# Patient Record
Sex: MALE | Race: BLACK OR AFRICAN AMERICAN | Employment: UNEMPLOYED | URBAN - METROPOLITAN AREA
[De-identification: names, ages, dates, MRNs, and addresses within clinical notes are randomized per-mention and may not be internally consistent; named-entity substitution may affect disease eponyms.]

---

## 2018-09-07 ENCOUNTER — OFFICE VISIT (OUTPATIENT)
Dept: FAMILY MEDICINE CLINIC | Age: 43
End: 2018-09-07

## 2018-09-07 VITALS
WEIGHT: 171 LBS | TEMPERATURE: 97.7 F | HEART RATE: 94 BPM | DIASTOLIC BLOOD PRESSURE: 73 MMHG | OXYGEN SATURATION: 98 % | HEIGHT: 61 IN | SYSTOLIC BLOOD PRESSURE: 113 MMHG | BODY MASS INDEX: 32.28 KG/M2 | RESPIRATION RATE: 18 BRPM

## 2018-09-07 DIAGNOSIS — Z02.89 ENCOUNTER FOR COMPLETION OF FORM WITH PATIENT: ICD-10-CM

## 2018-09-07 DIAGNOSIS — F42.4 SKIN PICKING HABIT: ICD-10-CM

## 2018-09-07 DIAGNOSIS — Z13.220 ENCOUNTER FOR LIPID SCREENING FOR CARDIOVASCULAR DISEASE: ICD-10-CM

## 2018-09-07 DIAGNOSIS — Z11.59 SCREENING FOR VIRAL DISEASE: ICD-10-CM

## 2018-09-07 DIAGNOSIS — Z13.228 SCREENING FOR ENDOCRINE, METABOLIC AND IMMUNITY DISORDER: ICD-10-CM

## 2018-09-07 DIAGNOSIS — Z13.1 SCREENING FOR DIABETES MELLITUS: ICD-10-CM

## 2018-09-07 DIAGNOSIS — Z11.1 ENCOUNTER FOR PPD TEST: ICD-10-CM

## 2018-09-07 DIAGNOSIS — Z13.6 ENCOUNTER FOR LIPID SCREENING FOR CARDIOVASCULAR DISEASE: ICD-10-CM

## 2018-09-07 DIAGNOSIS — Z00.00 ENCOUNTER FOR MEDICARE ANNUAL WELLNESS EXAM: Primary | ICD-10-CM

## 2018-09-07 DIAGNOSIS — Z13.29 SCREENING FOR ENDOCRINE, METABOLIC AND IMMUNITY DISORDER: ICD-10-CM

## 2018-09-07 DIAGNOSIS — R05.9 COUGH: ICD-10-CM

## 2018-09-07 DIAGNOSIS — Z28.21 INFLUENZA VACCINATION DECLINED: ICD-10-CM

## 2018-09-07 DIAGNOSIS — H61.20 WAX IN EAR: ICD-10-CM

## 2018-09-07 DIAGNOSIS — Z13.0 SCREENING FOR ENDOCRINE, METABOLIC AND IMMUNITY DISORDER: ICD-10-CM

## 2018-09-07 PROBLEM — Q90.9 DOWN SYNDROME: Status: ACTIVE | Noted: 2018-09-07

## 2018-09-07 RX ORDER — DEXTROMETHORPHAN POLISTIREX 30 MG/5ML
60 SUSPENSION ORAL
Qty: 148 ML | Refills: 0 | Status: SHIPPED | OUTPATIENT
Start: 2018-09-07 | End: 2018-10-04 | Stop reason: ALTCHOICE

## 2018-09-07 RX ORDER — CETIRIZINE HCL 10 MG
10 TABLET ORAL DAILY
Qty: 30 TAB | Refills: 0 | Status: SHIPPED | OUTPATIENT
Start: 2018-09-07 | End: 2018-10-04 | Stop reason: SDUPTHER

## 2018-09-07 NOTE — PROGRESS NOTES
9/7/2018 
3:05 PM 
 
 
This is an Initial Medicare Annual Wellness Exam (AWV) (Performed 12 months after IPPE or effective date of Medicare Part B enrollment, Once in a lifetime) I have reviewed the patient's medical history in detail and updated the computerized patient record. History Past Medical History:  
Diagnosis Date  Down syndrome Past Surgical History:  
Procedure Laterality Date  HX OTHER SURGICAL Right 1979  
 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  
 
 
Depression Risk Factor Screening: PHQ over the last two weeks 9/7/2018 PHQ Not Done Medical Reason (indicate in comments) Alcohol Risk Factor Screening: You do not drink alcohol or very rarely. Functional Ability and Level of Safety:  
 
Hearing Loss No hearing loss noted Activities of Daily Living Self-care. Requires assistance with: no ADLs Fall Risk No flowsheet data found. Abuse Screen Patient is not abused Denies financial, physical, or verbal abuse Review of Systems Constitutional: negative for fevers, chills and weight loss Respiratory: negative for cough, wheezing or dyspnea on exertion Cardiovascular: negative for chest pain, palpitations Gastrointestinal: negative for abdominal pain, N/V/D Musculoskeletal:negative for myalgias and muscle weakness Labwork/Imaging No results found for: GLU, GLUCPOC No results found for: CHOL, CHOLPOCT, CHOLX, CHLST, CHOLV, HDL, HDLPOC, LDL, LDLCPOC, LDLC, DLDLP, VLDLC, VLDL, TGLX, TRIGL, TRIGP, TGLPOCT, CHHD, CHHDX Physical Examination Evaluation of Cognitive Function: Mood/affect:  Neutral, nonverbal today Appearance: age appropriate, casually dressed and within normal Limits Family member/caregiver input: mother present Blood pressure 113/73, pulse 94, temperature 97.7 °F (36.5 °C), temperature source Oral, resp. rate 18, height 5' 1\" (1.549 m), weight 171 lb (77.6 kg), SpO2 98 %. Body mass index is 32.31 kg/(m^2). General appearance: alert, cooperative, no distress Lungs: clear to auscultation bilaterally Heart: regular rate and rhythm, S1, S2 normal, no murmur, click, rub or gallop Abdomen: soft, non-tender. Bowel sounds normal. No masses,  no organomegaly Extremities: extremities normal, atraumatic, no cyanosis or edema Patient Care Team: 
Macario Bloch, NP as PCP - General (Nurse Practitioner) Advice/Referrals/Counseling Education and counseling provided: 
Are appropriate based on today's review and evaluation Influenza Vaccine Cardiovascular screening blood test 
Diabetes screening test 
 
Assessment/Plan Diagnoses and all orders for this visit: 
 
Encounter for Medicare annual wellness exam 
*Medicare wellness completed. Education and counseling provided, recommendations made- see Medicare chart in patient instructions and see below. Encounter for lipid screening for cardiovascular disease -     LIPID PANEL; Future Screening for diabetes mellitus -     METABOLIC PANEL, COMPREHENSIVE; Future Screening for endocrine, metabolic and immunity disorder 
-     CBC W/O DIFF; Future -     METABOLIC PANEL, COMPREHENSIVE; Future 
-     TSH 3RD GENERATION; Future Influenza vaccination declined *Mother prefers to have done later in season. *Plan of care reviewed with parent. Parent in agreement with plan and expresses understanding. All questions answered and parent encouraged to call or RTO if further questions or concerns. Follow-up Disposition: 
Return in about 3 days (around 9/10/2018) for lab visit and PPD reading. Yearly provider visit for medicare wellness. ..

## 2018-09-07 NOTE — PROGRESS NOTES
Chief Complaint Patient presents with [address] [place] [address] Care  Complete Physical  
 Cough  
  x 2weeks 1. Have you been to the ER, urgent care clinic since your last visit? Hospitalized since your last visit? No 
 
2. Have you seen or consulted any other health care providers outside of the [address] [place] [address] since your last visit? Include any pap smears or colon screening.  No

## 2018-09-07 NOTE — MR AVS SNAPSHOT
[facility] [address] [facility] 
[phone] Patient: Claudia Kee MRN: [id number]:[id number] Visit Information Date & Time Provider Department Dept. Phone Encounter #  
 9/7/2018  1:30 PM Lul Govea [phone] [id number] Follow-up Instructions Return in about 3 days (around 9/10/2018) for lab visit and PPD reading. Yearly provider visit for medicare wellness. Eufemia Sanchez Upcoming Health Maintenance Date Due DTaP/Tdap/Td series (1 - Tdap) 2/23/1996 Influenza Age 5 to Adult 8/1/2018 Allergies as of 9/7/2018  Review Complete On: 9/7/2018 By: Russell Bradshaw LPN No Known Allergies Current Immunizations  Never Reviewed Name Date  
 TB Skin Test (PPD) Intradermal  Incomplete Not reviewed this visit You Were Diagnosed With   
  
 Codes Comments Encounter for Medicare annual wellness exam    -  Primary ICD-10-CM: Z00.00 ICD-9-CM: V70.0 Encounter for lipid screening for cardiovascular disease     ICD-10-CM: Z13.220, Z13.6 ICD-9-CM: V77.91, V81.2 Screening for diabetes mellitus     ICD-10-CM: Z13.1 ICD-9-CM: V77.1 Screening for endocrine, metabolic and immunity disorder     ICD-10-CM: Z13.29, Z13.228, Z13.0 ICD-9-CM: V77.99 Screening for viral disease     ICD-10-CM: Z11.59 
ICD-9-CM: V73.99 Cough     ICD-10-CM: R05 ICD-9-CM: 786.2 Skin picking habit     ICD-10-CM: F42.4 ICD-9-CM: 307.9 Encounter for PPD test     ICD-10-CM: Z11.1 ICD-9-CM: V74.1 Encounter for laboratory examination     ICD-10-CM: Z01.89 ICD-9-CM: V72.60 Vitals BP Pulse Temp Resp Height(growth percentile) Weight(growth percentile) 113/73 (BP 1 Location: Left arm, BP Patient Position: Sitting) 94 97.7 °F (36.5 °C) (Oral) 18 5' 1\" (1.549 m) 171 lb (77.6 kg) SpO2 BMI Smoking Status 98% 32.31 kg/m2 Never Smoker BMI and BSA Data Body Mass Index Body Surface Area  
 32.31 kg/m 2 1.83 m 2 Preferred Pharmacy Pharmacy Name Phone [facility] [address] [phone] Your Updated Medication List  
  
   
This list is accurate as of 9/7/18  2:19 PM.  Always use your most recent med list.  
  
  
  
  
 cetirizine 10 mg tablet Commonly known as:  ZYRTEC Take 1 Tab by mouth daily. dextromethorphan 30 mg/5 mL liquid Commonly known as:  Delsym Take 10 mL by mouth nightly as needed for Cough. Prescriptions Sent to Pharmacy Refills  
 cetirizine (ZYRTEC) 10 mg tablet 0 Sig: Take 1 Tab by mouth daily. Class: Normal  
 Pharmacy: [address] [place] [address] Ph #: [phone] Route: Oral  
 dextromethorphan (DELSYM) 30 mg/5 mL liquid 0 Sig: Take 10 mL by mouth nightly as needed for Cough. Class: Normal  
 Pharmacy: [address] [place] [address] Ph #: [phone] Route: Oral  
  
We Performed the Following AMB POC TUBERCULOSIS, INTRADERMAL (SKIN TEST) [39292 CPT(R)] COLLECTION VENOUS BLOOD,VENIPUNCTURE S118614 CPT(R)] Follow-up Instructions Return in about 3 days (around 9/10/2018) for lab visit and PPD reading. Yearly provider visit for medicare wellness. Tyrone Nelson To-Do List   
 09/07/2018 Lab:  HEP B SURFACE AB Around 09/08/2018 Lab:  CBC W/O DIFF Around 09/08/2018 Lab:  LIPID PANEL Around 09/08/2018 Lab:  METABOLIC PANEL, COMPREHENSIVE Around 09/08/2018 Lab:  TSH 3RD GENERATION Introducing [facility] & HEALTH SERVICES! [facility] introduces WhoisEDI patient portal. Now you can access parts of your medical record, email your doctor's office, and request medication refills online. 1. In your internet browser, go to [email or web address] [employer] 2. Click on the First Time User? Click Here link in the Sign In box. You will see the New Member Sign Up page. 3. Enter your Ryonet Access Code exactly as it appears below. You will not need to use this code after youve completed the sign-up process. If you do not sign up before the expiration date, you must request a new code. · Ryonet Access Code: [email or web address] Expires: 12/6/2018  1:33 PM 
 
4. Enter the last four digits of your Social Security Number (xxxx) and Date of Birth (mm/dd/yyyy) as indicated and click Submit. You will be taken to the next sign-up page. 5. Create a [employer] ID. This will be your Ryonet login ID and cannot be changed, so think of one that is secure and easy to remember. 6. Create a Ryonet password. You can change your password at any time. 7. Enter your Password Reset Question and Answer. This can be used at a later time if you forget your password. 8. Enter your e-mail address. You will receive e-mail notification when new information is available in [address] 9. Click Sign Up. You can now view and download portions of your medical record. 10. Click the Download Summary menu link to download a portable copy of your medical information. If you have questions, please visit the Frequently Asked Questions section of the Ryonet website. Remember, Ryonet is NOT to be used for urgent needs. For medical emergencies, dial 911. Now available from your iPhone and Android! Please provide this summary of care documentation to your next provider. Your primary care clinician is listed as Carlos Goldberg. If you have any questions after today's visit, please call [phone].

## 2018-09-07 NOTE — PROGRESS NOTES
OFFICE NOTE Rey West is a 37 y.o. male presenting today for office visit. 9/7/2018 
1:50 PM 
 
 
Chief Complaint Patient presents with [address] [facility] Establish Care  Complete Physical  
 Cough  
  x 2weeks HPI: Here today as a new patient, establishing care. Mother here today with patient. Medicare wellness completed (see other note). Previously seeing Saloni Corbett for PCP- no records available at this time. Patient with Down's Syndrome- nonverbal around new people per mother but is verbal at home. Needs form completed for [facility]. Mother reports a couple concerns. First, patient has been coughing at night time for about 2 weeks- no ENT symptoms, SOB, or wheezing. Has not been taking anything for symptoms. Does go to day support so is around sick contacts at times. Also reports some small marks on his arms that appear to be bites or new pick marks that are occurring. Also reports noticing some ear wax. Review of Systems Reason unable to perform ROS: obtained from mother. Constitutional: Negative for activity change, appetite change and fever. HENT: Negative for congestion, ear pain, rhinorrhea, sneezing, sore throat and trouble swallowing. Eyes: Negative for discharge and itching. Respiratory: Positive for cough. Negative for shortness of breath and wheezing. Cardiovascular: Negative for chest pain and leg swelling. Gastrointestinal: Negative for abdominal pain, constipation, diarrhea and vomiting. Genitourinary: Negative for difficulty urinating and frequency. Musculoskeletal: Negative for gait problem. Skin: Negative for rash. Neurological: Negative for seizures and headaches. Psychiatric/Behavioral: Negative for dysphoric mood and sleep disturbance. The patient is not nervous/anxious. PHQ Screening PHQ over the last two weeks 9/7/2018 PHQ Not Done Medical Reason (indicate in comments) History Past Medical History: Diagnosis Date  Down syndrome Past Surgical History:  
Procedure Laterality Date  HX OTHER SURGICAL Right 1979  
 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  
 Number of children: N/A  
 Years of education: N/A Occupational History  Not on file. Social History Main Topics  Smoking status: Never Smoker  Smokeless tobacco: Never Used  Alcohol use No  
 Drug use: No  
 Sexual activity: No  
 
Other Topics Concern  Not on file Social History Narrative  No narrative on file No Known Allergies No current daily medications Patient Care Team: 
Patient Care Team: 
Anushka Vasquez NP as PCP - General (Nurse Practitioner) LABS: 
None new to review RADIOLOGY: 
None new to review Physical Exam  
Constitutional: He is oriented to person, place, and time. He appears well-developed and well-nourished. No distress. HENT:  
Right Ear: Tympanic membrane, external ear and ear canal normal.  
Left Ear: Tympanic membrane, external ear and ear canal normal.  
Nose: Nose normal. No mucosal edema or rhinorrhea. Right sinus exhibits no maxillary sinus tenderness and no frontal sinus tenderness. Left sinus exhibits no maxillary sinus tenderness and no frontal sinus tenderness. Mouth/Throat: Uvula is midline, oropharynx is clear and moist and mucous membranes are normal. No oropharyngeal exudate or posterior oropharyngeal erythema.  
+mild dried cerumen bilateral ear canals Eyes: Right eye exhibits no discharge. Left eye exhibits no discharge. Left pupil is reactive. +right eye surgically absent Neck: Normal range of motion. Neck supple. Cardiovascular: Normal rate, regular rhythm and normal heart sounds. No murmur heard. Pulmonary/Chest: Effort normal and breath sounds normal. No respiratory distress. Abdominal: Soft. Bowel sounds are normal. There is no tenderness. Lymphadenopathy:  
  He has no cervical adenopathy. Neurological: He is alert and oriented to person, place, and time. Skin: Skin is warm and dry.  
-scattered small scabs and scars noted to all extremities; no erythema or drainage Psychiatric: His mood appears not anxious. He does not express impulsivity. He does not exhibit a depressed mood. He is noncommunicative.  
+follows simple directions Vitals:  
 09/07/18 1346 BP: 113/73 Pulse: 94 Resp: 18 Temp: 97.7 °F (36.5 °C) TempSrc: Oral  
SpO2: 98% Weight: 171 lb (77.6 kg) Height: 5' 1\" (1.549 m) PainSc:   0 - No pain Assessment and Plan Cough *Advised on normal exam findings. Will do PRN Delsym at this time. If not improving in 2 weeks or with new symptoms, consider further workup.  
- dextromethorphan (DELSYM) 30 mg/5 mL liquid; Take 10 mL by mouth nightly as needed for Cough. Dispense: 148 mL; Refill: 0 Skin picking habit *Will trial Zyrtec to see if improvement is noted. - cetirizine (ZYRTEC) 10 mg tablet; Take 1 Tab by mouth daily. Dispense: 30 Tab; Refill: 0 Wax in ear *Advised that no ear flushing is needed at this time. Normal amount of ear wax noted on examination. Encounter for completion of form with patient *Form started today. Awaiting reading of PPD, as well as labs. Screening for viral disease *Need evaluation of Hep B vaccination status for form. - HEP B SURFACE AB; Future Encounter for PPD test 
- AMB POC TUBERCULOSIS, INTRADERMAL (SKIN TEST) *Plan of care reviewed with parent. Parent in agreement with plan and expresses understanding. All questions answered and parent encouraged to call or RTO if further questions or concerns. Follow-up Disposition: 
Return in about 3 days (around 9/10/2018) for lab visit and PPD reading. Yearly provider visit for medicare wellness. Trixie Segura

## 2018-09-10 ENCOUNTER — HOSPITAL ENCOUNTER (OUTPATIENT)
Dept: LAB | Age: 43
Discharge: HOME | End: 2018-09-10
Payer: COMMERCIAL

## 2018-09-10 ENCOUNTER — LAB ONLY (OUTPATIENT)
Dept: FAMILY MEDICINE CLINIC | Age: 43
End: 2018-09-10

## 2018-09-10 DIAGNOSIS — Z13.1 SCREENING FOR DIABETES MELLITUS: ICD-10-CM

## 2018-09-10 DIAGNOSIS — Z13.220 ENCOUNTER FOR LIPID SCREENING FOR CARDIOVASCULAR DISEASE: ICD-10-CM

## 2018-09-10 DIAGNOSIS — Z13.228 SCREENING FOR ENDOCRINE, METABOLIC AND IMMUNITY DISORDER: ICD-10-CM

## 2018-09-10 DIAGNOSIS — Z01.89 ENCOUNTER FOR LABORATORY TEST: Primary | ICD-10-CM

## 2018-09-10 DIAGNOSIS — Z13.29 SCREENING FOR ENDOCRINE, METABOLIC AND IMMUNITY DISORDER: ICD-10-CM

## 2018-09-10 DIAGNOSIS — Z11.59 SCREENING FOR VIRAL DISEASE: ICD-10-CM

## 2018-09-10 DIAGNOSIS — Z13.6 ENCOUNTER FOR LIPID SCREENING FOR CARDIOVASCULAR DISEASE: ICD-10-CM

## 2018-09-10 DIAGNOSIS — Z13.0 SCREENING FOR ENDOCRINE, METABOLIC AND IMMUNITY DISORDER: ICD-10-CM

## 2018-09-10 LAB
CHOLEST SERPL-MCNC: 160 MG/DL
ERYTHROCYTE [DISTWIDTH] IN BLOOD BY AUTOMATED COUNT: 14.6 % (ref 11.6–14.5)
HCT VFR BLD AUTO: 41.5 % (ref 36–48)
HDLC SERPL-MCNC: 43 MG/DL (ref 40–60)
HDLC SERPL: 3.7 {RATIO} (ref 0–5)
HGB BLD-MCNC: 13.6 G/DL (ref 13–16)
LDLC SERPL CALC-MCNC: 104.4 MG/DL (ref 0–100)
LIPID PROFILE,FLP: ABNORMAL
MCH RBC QN AUTO: 31.5 PG (ref 24–34)
MCHC RBC AUTO-ENTMCNC: 32.8 G/DL (ref 31–37)
MCV RBC AUTO: 96.1 FL (ref 74–97)
MM INDURATION POC: 0 MM (ref 0–5)
PLATELET # BLD AUTO: 336 K/UL (ref 135–420)
PMV BLD AUTO: 10.1 FL (ref 9.2–11.8)
PPD POC: NEGATIVE NEGATIVE
RBC # BLD AUTO: 4.32 M/UL (ref 4.7–5.5)
TRIGL SERPL-MCNC: 63 MG/DL (ref ?–150)
TSH SERPL DL<=0.05 MIU/L-ACNC: 0.02 UIU/ML (ref 0.36–3.74)
VLDLC SERPL CALC-MCNC: 12.6 MG/DL
WBC # BLD AUTO: 5.5 K/UL (ref 4.6–13.2)

## 2018-09-10 PROCEDURE — 84443 ASSAY THYROID STIM HORMONE: CPT | Performed by: NURSE PRACTITIONER

## 2018-09-10 PROCEDURE — 86706 HEP B SURFACE ANTIBODY: CPT | Performed by: NURSE PRACTITIONER

## 2018-09-10 PROCEDURE — 80061 LIPID PANEL: CPT | Performed by: NURSE PRACTITIONER

## 2018-09-10 PROCEDURE — 85027 COMPLETE CBC AUTOMATED: CPT | Performed by: NURSE PRACTITIONER

## 2018-09-10 PROCEDURE — 80053 COMPREHEN METABOLIC PANEL: CPT | Performed by: NURSE PRACTITIONER

## 2018-09-10 NOTE — PROGRESS NOTES
Patient here today for lab visit at this time. Venipuncture to left forearm at this time. Patient tolerated well at this time. Patient also was here for PPD reading at this time. Reading noted to left forearm at 0mm negative. No redness,no swelling noted at this time.

## 2018-09-11 ENCOUNTER — TELEPHONE (OUTPATIENT)
Dept: FAMILY MEDICINE CLINIC | Age: 43
End: 2018-09-11

## 2018-09-11 DIAGNOSIS — R73.01 ELEVATED FASTING GLUCOSE: Primary | ICD-10-CM

## 2018-09-11 DIAGNOSIS — R79.89 LOW TSH LEVEL: ICD-10-CM

## 2018-09-11 PROBLEM — Z78.9 NOT IMMUNE TO HEPATITIS B VIRUS: Status: ACTIVE | Noted: 2018-09-11

## 2018-09-11 LAB
ALBUMIN SERPL-MCNC: 3.4 G/DL (ref 3.4–5)
ALBUMIN/GLOB SERPL: 0.8 {RATIO} (ref 0.8–1.7)
ALP SERPL-CCNC: 81 U/L (ref 45–117)
ALT SERPL-CCNC: 31 U/L (ref 16–61)
ANION GAP SERPL CALC-SCNC: 10 MMOL/L (ref 3–18)
AST SERPL-CCNC: 17 U/L (ref 15–37)
BILIRUB SERPL-MCNC: 0.3 MG/DL (ref 0.2–1)
BUN SERPL-MCNC: 21 MG/DL (ref 7–18)
BUN/CREAT SERPL: 17 (ref 12–20)
CALCIUM SERPL-MCNC: 8.4 MG/DL (ref 8.5–10.1)
CHLORIDE SERPL-SCNC: 107 MMOL/L (ref 100–108)
CO2 SERPL-SCNC: 27 MMOL/L (ref 21–32)
CREAT SERPL-MCNC: 1.25 MG/DL (ref 0.6–1.3)
GLOBULIN SER CALC-MCNC: 4.2 G/DL (ref 2–4)
GLUCOSE SERPL-MCNC: 119 MG/DL (ref 74–99)
HBV SURFACE AB SER QL IA: NEGATIVE
HBV SURFACE AB SERPL IA-ACNC: 4.26 MIU/ML
HEP BS AB COMMENT,HBSAC: ABNORMAL
POTASSIUM SERPL-SCNC: 4.4 MMOL/L (ref 3.5–5.5)
PROT SERPL-MCNC: 7.6 G/DL (ref 6.4–8.2)
SODIUM SERPL-SCNC: 144 MMOL/L (ref 136–145)

## 2018-09-11 NOTE — TELEPHONE ENCOUNTER
2018  3:38 PM    Chief Complaint   Patient presents with    Results    Hepatitis B     needs vaccine       Noted results of lab. Called mother at this time- verified patient by name and . Advised on findings of elevated glucose and low TSH. Advised on negative Hep B titer. Recommend recheck on TSH, as well as addition of Free T4 and HgBa1c. Recommend Hep B series which will be 0, 1, 6 months. She will try to bring patient into office tomorrow but may be next week. Labs pended in system. Will need Hep B vaccine ordered when comes into office. *Plan of care reviewed with parent. Parent in agreement with plan and expresses understanding. All questions answered and parent encouraged to call or RTO if further questions or concerns. Results for orders placed or performed during the hospital encounter of 09/10/18   CBC W/O DIFF   Result Value Ref Range    WBC 5.5 4.6 - 13.2 K/uL    RBC 4.32 (L) 4.70 - 5.50 M/uL    HGB 13.6 13.0 - 16.0 g/dL    HCT 41.5 36.0 - 48.0 %    MCV 96.1 74.0 - 97.0 FL    MCH 31.5 24.0 - 34.0 PG    MCHC 32.8 31.0 - 37.0 g/dL    RDW 14.6 (H) 11.6 - 14.5 %    PLATELET 912 908 - 785 K/uL    MPV 10.1 9.2 - 48.6 FL   METABOLIC PANEL, COMPREHENSIVE   Result Value Ref Range    Sodium 144 136 - 145 mmol/L    Potassium 4.4 3.5 - 5.5 mmol/L    Chloride 107 100 - 108 mmol/L    CO2 27 21 - 32 mmol/L    Anion gap 10 3.0 - 18 mmol/L    Glucose 119 (H) 74 - 99 mg/dL    BUN 21 (H) 7.0 - 18 MG/DL    Creatinine 1.25 0.6 - 1.3 MG/DL    BUN/Creatinine ratio 17 12 - 20      GFR est AA >60 >60 ml/min/1.73m2    GFR est non-AA >60 >60 ml/min/1.73m2    Calcium 8.4 (L) 8.5 - 10.1 MG/DL    Bilirubin, total 0.3 0.2 - 1.0 MG/DL    ALT (SGPT) 31 16 - 61 U/L    AST (SGOT) 17 15 - 37 U/L    Alk.  phosphatase 81 45 - 117 U/L    Protein, total 7.6 6.4 - 8.2 g/dL    Albumin 3.4 3.4 - 5.0 g/dL    Globulin 4.2 (H) 2.0 - 4.0 g/dL    A-G Ratio 0.8 0.8 - 1.7     LIPID PANEL   Result Value Ref Range    LIPID PROFILE Cholesterol, total 160 <200 MG/DL    Triglyceride 63 <150 MG/DL    HDL Cholesterol 43 40 - 60 MG/DL    LDL, calculated 104.4 (H) 0 - 100 MG/DL    VLDL, calculated 12.6 MG/DL    CHOL/HDL Ratio 3.7 0 - 5.0     TSH 3RD GENERATION   Result Value Ref Range    TSH 0.02 (L) 0.36 - 3.74 uIU/mL   HEP B SURFACE AB   Result Value Ref Range    Hepatitis B surface Ab 4.26 (L) >10.0 mIU/mL    Hep B surface Ab Interp. NEGATIVE  (A) POS      Hep B surface Ab comment        Samples with a  value of 10 mIU/mL or greater are considered positive (protective immunity) in accordance with the CDC guidelines.

## 2018-09-12 ENCOUNTER — OFFICE VISIT (OUTPATIENT)
Dept: FAMILY MEDICINE CLINIC | Age: 43
End: 2018-09-12

## 2018-09-12 ENCOUNTER — HOSPITAL ENCOUNTER (OUTPATIENT)
Dept: LAB | Age: 43
Discharge: HOME | End: 2018-09-12
Payer: COMMERCIAL

## 2018-09-12 DIAGNOSIS — Z23 ENCOUNTER FOR IMMUNIZATION: ICD-10-CM

## 2018-09-12 DIAGNOSIS — Z01.89 ENCOUNTER FOR LABORATORY EXAMINATION: ICD-10-CM

## 2018-09-12 DIAGNOSIS — Z78.9 NOT IMMUNE TO HEPATITIS B VIRUS: ICD-10-CM

## 2018-09-12 DIAGNOSIS — Z23 NEED FOR HEPATITIS B VACCINATION: Primary | ICD-10-CM

## 2018-09-12 DIAGNOSIS — R73.01 ELEVATED FASTING GLUCOSE: ICD-10-CM

## 2018-09-12 DIAGNOSIS — R79.89 LOW TSH LEVEL: ICD-10-CM

## 2018-09-12 PROCEDURE — 84439 ASSAY OF FREE THYROXINE: CPT | Performed by: NURSE PRACTITIONER

## 2018-09-12 PROCEDURE — 83036 HEMOGLOBIN GLYCOSYLATED A1C: CPT | Performed by: NURSE PRACTITIONER

## 2018-09-12 PROCEDURE — 84443 ASSAY THYROID STIM HORMONE: CPT | Performed by: NURSE PRACTITIONER

## 2018-09-12 NOTE — PROGRESS NOTES
Chief Complaint   Patient presents with    Immunization/Injection     pt here for immunization Hep B injection    Labs     pt here for labs       Ashley Oconnell is a 37 y.o. male who presents for routine immunizations. He denies any symptoms , reactions or allergies that would exclude them from being immunized today. Risks and adverse reactions were discussed and the VIS was given to them. All questions were addressed. He was observed for 15 min post injection. There were no reactions observed. Yaa Rizo LPN    Pt here for labs. Venipuncture done in left arm. Pt tolerated well. HbA1C, TSH,and T4 obtained. No comps at this time.

## 2018-09-13 LAB
EST. AVERAGE GLUCOSE BLD GHB EST-MCNC: 117 MG/DL
HBA1C MFR BLD: 5.7 % (ref 4.2–5.6)
T4 FREE SERPL-MCNC: 1.5 NG/DL (ref 0.7–1.5)
TSH SERPL DL<=0.05 MIU/L-ACNC: 0.01 UIU/ML (ref 0.36–3.74)

## 2018-09-14 PROBLEM — R73.01 ELEVATED FASTING GLUCOSE: Status: RESOLVED | Noted: 2018-09-11 | Resolved: 2018-09-14

## 2018-09-14 PROBLEM — R73.03 PREDIABETES: Status: ACTIVE | Noted: 2018-09-14

## 2018-10-04 ENCOUNTER — OFFICE VISIT (OUTPATIENT)
Dept: FAMILY MEDICINE CLINIC | Age: 43
End: 2018-10-04

## 2018-10-04 VITALS
WEIGHT: 171 LBS | HEIGHT: 61 IN | RESPIRATION RATE: 20 BRPM | BODY MASS INDEX: 32.28 KG/M2 | OXYGEN SATURATION: 96 % | HEART RATE: 72 BPM | SYSTOLIC BLOOD PRESSURE: 108 MMHG | DIASTOLIC BLOOD PRESSURE: 70 MMHG | TEMPERATURE: 96.3 F

## 2018-10-04 DIAGNOSIS — S81.802A WOUND OF LEFT LOWER EXTREMITY, INITIAL ENCOUNTER: ICD-10-CM

## 2018-10-04 DIAGNOSIS — R05.9 COUGH: ICD-10-CM

## 2018-10-04 DIAGNOSIS — R21 RASH AND NONSPECIFIC SKIN ERUPTION: Primary | ICD-10-CM

## 2018-10-04 DIAGNOSIS — J30.1 ALLERGIC RHINITIS DUE TO POLLEN, UNSPECIFIED SEASONALITY: ICD-10-CM

## 2018-10-04 RX ORDER — PREDNISONE 20 MG/1
TABLET ORAL
Qty: 11 TAB | Refills: 0 | Status: SHIPPED | OUTPATIENT
Start: 2018-10-04 | End: 2018-10-24 | Stop reason: ALTCHOICE

## 2018-10-04 RX ORDER — FLUTICASONE PROPIONATE 50 MCG
2 SPRAY, SUSPENSION (ML) NASAL DAILY
Qty: 1 BOTTLE | Refills: 0 | Status: SHIPPED | OUTPATIENT
Start: 2018-10-04 | End: 2019-01-02 | Stop reason: SDUPTHER

## 2018-10-04 RX ORDER — CETIRIZINE HCL 10 MG
10 TABLET ORAL DAILY
Qty: 30 TAB | Refills: 0 | Status: SHIPPED | OUTPATIENT
Start: 2018-10-04 | End: 2019-01-02 | Stop reason: SDUPTHER

## 2018-10-04 RX ORDER — TRIAMCINOLONE ACETONIDE 1 MG/G
CREAM TOPICAL
Qty: 45 G | Refills: 0 | Status: SHIPPED | OUTPATIENT
Start: 2018-10-04 | End: 2019-01-02 | Stop reason: SDUPTHER

## 2018-10-04 NOTE — MR AVS SNAPSHOT
[facility] [address] [facility] 
[phone] Patient: Kevin Drew MRN: [id number] WXL:[birth year]50[birth year] Visit Information Date & Time Provider Department Dept. Phone Encounter #  
 10/4/2018  2:15 PM Tracie Crandall, [address] [phone] [id number] Follow-up Instructions Return if symptoms worsen or fail to improve. Upcoming Health Maintenance Date Due Influenza Age 5 to Adult 3/31/2019* DTaP/Tdap/Td series (2 - Td) 1/8/2025 *Topic was postponed. The date shown is not the original due date. Allergies as of 10/4/2018  Review Complete On: 10/4/2018 By: Tracie Crandall NP No Known Allergies Current Immunizations  Reviewed on 9/12/2018 Name Date Hep B Vaccine (Adult) 9/12/2018 Influenza Vaccine 9/25/2017, 10/18/2016, 10/21/2015, 11/10/2014 TB Skin Test (PPD) Intradermal 9/7/2018  2:20 PM  
 Td 6/5/1997 Tdap 1/8/2015 Not reviewed this visit You Were Diagnosed With   
  
 Codes Comments Rash and nonspecific skin eruption    -  Primary ICD-10-CM: R21 
ICD-9-CM: 782.1 Allergic rhinitis due to pollen, unspecified seasonality     ICD-10-CM: J30.1 ICD-9-CM: 477.0 Cough     ICD-10-CM: R05 ICD-9-CM: 232. 2 Vitals BP Pulse Temp Resp Height(growth percentile) Weight(growth percentile) 108/70 (BP 1 Location: Right arm, BP Patient Position: Sitting) 72 96.3 °F (35.7 °C) (Oral) 20 5' 1\" (1.549 m) 171 lb (77.6 kg) SpO2 BMI Smoking Status 96% 32.31 kg/m2 Never Smoker Vitals History BMI and BSA Data Body Mass Index Body Surface Area  
 32.31 kg/m 2 1.83 m 2 Preferred Pharmacy Pharmacy Name Phone [facility] [address] [phone] Your Updated Medication List  
  
   
 This list is accurate as of 10/4/18  2:59 PM.  Always use your most recent med list.  
  
  
  
  
 cetirizine 10 mg tablet Commonly known as:  ZYRTEC Take 1 Tab by mouth daily. fluticasone 50 mcg/actuation nasal spray Commonly known as:  Pleas Small 2 Sprays by Both Nostrils route daily. predniSONE 20 mg tablet Commonly known as:  Rozina Dickerson Take 2 tabs daily for 3 days, then 1 tab daily for 3 days, then 1/2 tab daily for 4 days  
  
 triamcinolone acetonide 0.1 % topical cream  
Commonly known as:  KENALOG Apply  to affected area two (2) times daily as needed for Skin Irritation or Itching. use thin layer to small areas Prescriptions Sent to Pharmacy Refills  
 predniSONE (DELTASONE) 20 mg tablet 0 Sig: Take 2 tabs daily for 3 days, then 1 tab daily for 3 days, then 1/2 tab daily for 4 days Class: Normal  
 Pharmacy: [facility] [employer] [place] [address] Ph #: [phone]  
 triamcinolone acetonide (KENALOG) 0.1 % topical cream 0 Sig: Apply  to affected area two (2) times daily as needed for Skin Irritation or Itching. use thin layer to small areas Class: Normal  
 Pharmacy: [address] [place] [address] Ph #: [phone] Route: Topical  
 fluticasone (FLONASE) 50 mcg/actuation nasal spray 0 Si Sprays by Both Nostrils route daily. Class: Normal  
 Pharmacy: [address] [place] [address] Ph #: [phone] Route: Both Nostrils  
 cetirizine (ZYRTEC) 10 mg tablet 0 Sig: Take 1 Tab by mouth daily. Class: Normal  
 Pharmacy: [address] [place] [address] Ph #: [phone] Route: Oral  
  
Follow-up Instructions Return if symptoms worsen or fail to improve. Introducing [facility] & HEALTH SERVICES! [facility] introduces [email or web address] patient portal. Now you can access parts of your medical record, email your doctor's office, and request medication refills online. 1. In your internet browser, go to [email or web address] 2. Click on the First Time User? Click Here link in the Sign In box. You will see the New Member Sign Up page. 3. Enter your Moment.me Access Code exactly as it appears below. You will not need to use this code after youve completed the sign-up process. If you do not sign up before the expiration date, you must request a new code. · [email or web address].me Access Code: [email or web address] Expires: 12/6/2018  1:33 PM 
 
4. Enter the last four digits of your Social Security Number (xxxx) and Date of Birth (mm/dd/yyyy) as indicated and click Submit. You will be taken to the next sign-up page. 5. Create a Moment.me ID. This will be your Moment.me login ID and cannot be changed, so think of one that is secure and easy to remember. 6. Create a Moment.me password. You can change your password at any time. 7. Enter your Password Reset Question and Answer. This can be used at a later time if you forget your password. 8. Enter your e-mail address. You will receive e-mail notification when new information is available in [address] 9. Click Sign Up. You can now view and download portions of your medical record. 10. Click the Download Summary menu link to download a portable copy of your medical information. If you have questions, please visit the Frequently Asked Questions section of the [email or web address] website. Remember, Moment.me is NOT to be used for urgent needs. For medical emergencies, dial 911. Now available from your iPhone and Android! Please provide this summary of care documentation to your next provider. Your primary care clinician is listed as Andrez Nguyen. If you have any questions after today's visit, please call [phone].

## 2018-10-04 NOTE — PROGRESS NOTES
OFFICE NOTE    Emma Hodgson is a 37 y.o. male presenting today for office visit. 10/4/2018  2:25 PM      Chief Complaint   Patient presents with    Rash     pt here with c/o itching and rash to areas on body         HPI: Here today with mother for complaints of rash that has been happening for about a week. Has rash on back, neck, shoulders, arms, and legs. Mother reports that he has been itching more and more. Mother reports that he has a caregiver that takes him to her home- has two cats now- in the past, the caregiver had a dog which called similar symptoms. Mother has been using neosporin- does not seem to be helping. No pain is being reported. Nothing has made better or worse. Mother also reports that he has been continuing to have a lingering cough that has been happening for about 1-2 months. Has been giving Delsym PRN which does seem to help. Believes that she is giving Zyrtec as well. No ENT symptoms that are consistent- some nasal congestion and sniffling at times. No SOB, or wheezing. Does go to day support so is around sick contacts at times. Review of Systems   Reason unable to perform ROS: obtained from mother. Constitutional: Negative for activity change, appetite change, chills, fatigue and fever. HENT: Positive for congestion and rhinorrhea. Negative for ear pain, facial swelling, sinus pressure, sneezing, sore throat and trouble swallowing. Eyes: Negative for pain, discharge, itching and visual disturbance. Respiratory: Positive for cough. Negative for shortness of breath and wheezing. Cardiovascular: Negative for chest pain and leg swelling. Gastrointestinal: Negative for abdominal pain, constipation, diarrhea, nausea and vomiting. Genitourinary: Negative for difficulty urinating and frequency. Musculoskeletal: Negative for arthralgias, gait problem and myalgias. Skin: Positive for rash. Allergic/Immunologic: Negative for environmental allergies. Neurological: Negative for seizures and headaches. Psychiatric/Behavioral: Negative for dysphoric mood and sleep disturbance. The patient is not nervous/anxious. 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 Social History Main Topics    Smoking status: Never Smoker    Smokeless tobacco: Never Used    Alcohol use No    Drug use: No    Sexual activity: No     Other Topics Concern    Not on file     Social History Narrative       No Known Allergies    Current Outpatient Prescriptions   Medication Sig Dispense Refill    cetirizine (ZYRTEC) 10 mg tablet Take 1 Tab by mouth daily. 30 Tab 0         Patient Care Team:  Patient Care Team:  Judson Ley NP as PCP - General (Nurse Practitioner)        LABS:  None new to review    RADIOLOGY:  None new to review      Physical Exam   Constitutional: He is oriented to person, place, and time. He appears well-developed and well-nourished. No distress. HENT:   Right Ear: Tympanic membrane, external ear and ear canal normal.   Left Ear: Tympanic membrane, external ear and ear canal normal.   Nose: Nose normal. No mucosal edema or rhinorrhea. Right sinus exhibits no maxillary sinus tenderness and no frontal sinus tenderness. Left sinus exhibits no maxillary sinus tenderness and no frontal sinus tenderness. Mouth/Throat: Uvula is midline, oropharynx is clear and moist and mucous membranes are normal. No oropharyngeal exudate or posterior oropharyngeal erythema.    Eyes: EOM are normal. Pupils are equal, round, and reactive to light. Right eye exhibits no discharge. Left eye exhibits no discharge. Left pupil is reactive. +right eye surgically absent   Neck: Normal range of motion. Neck supple. Cardiovascular: Normal rate, regular rhythm and normal heart sounds. No murmur heard. Pulmonary/Chest: Effort normal and breath sounds normal. No respiratory distress. Abdominal: Soft. Bowel sounds are normal. There is no tenderness. Lymphadenopathy:     He has no cervical adenopathy. Neurological: He is alert and oriented to person, place, and time. Skin: Skin is warm and dry. Rash noted. Rash is papular.   -scattered small scabs and scars noted to all extremities  -fine papular rash noted to neck and across shoulders, as well as some scattered on bilateral upper and lower extremities  -2 cm by 1 cm open scratch shyam noted to left medial calf with mild redness but no heat; no drainage   Psychiatric: His mood appears not anxious. He does not express impulsivity. He does not exhibit a depressed mood. He is noncommunicative.   +follows simple directions         Vitals:    10/04/18 1424   BP: 108/70   Pulse: 72   Resp: 20   Temp: 96.3 °F (35.7 °C)   TempSrc: Oral   SpO2: 96%   Weight: 171 lb (77.6 kg)   Height: 5' 1\" (1.549 m)   PainSc:   0 - No pain         Assessment and Plan    Rash and nonspecific skin eruption  *Discussed with mother. Patient will be having a new caregiver so enivornmetal exposure to pets will no longer be occurring. Will place on Prednisone taper with some PRN steroid cream to small areas that are intense itching. Zyrtec as below. Discourage skin picking.   - predniSONE (DELTASONE) 20 mg tablet; Take 2 tabs daily for 3 days, then 1 tab daily for 3 days, then 1/2 tab daily for 4 days  Dispense: 11 Tab; Refill: 0  - triamcinolone acetonide (KENALOG) 0.1 % topical cream; Apply  to affected area two (2) times daily as needed for Skin Irritation or Itching.  use thin layer to small areas  Dispense: 45 g; Refill: 0    Wound of left lower extremity, initial encounter  *Advised on continued monitoring. Continue with neosporin and discourage picking. Advised to RTO or call if redness is extending or if heat begins to occur in area or fevers. Allergic rhinitis due to pollen, unspecified seasonality/  Cough  *Encouraged administration of Zyrtec and will do Flonase. If not improved in about one week with consistent usage, will order xray for patient to have completed of chest.   - fluticasone (FLONASE) 50 mcg/actuation nasal spray; 2 Sprays by Both Nostrils route daily. Dispense: 1 Bottle; Refill: 0  - cetirizine (ZYRTEC) 10 mg tablet; Take 1 Tab by mouth daily. Dispense: 30 Tab; Refill: 0      *Plan of care reviewed with patient. Patient in agreement with plan and expresses understanding. All questions answered and patient encouraged to call or RTO if further questions or concerns. Follow-up Disposition:  Return if symptoms worsen or fail to improve.

## 2018-10-24 ENCOUNTER — OFFICE VISIT (OUTPATIENT)
Dept: FAMILY MEDICINE CLINIC | Age: 43
End: 2018-10-24

## 2018-10-24 VITALS
HEIGHT: 61 IN | DIASTOLIC BLOOD PRESSURE: 75 MMHG | RESPIRATION RATE: 20 BRPM | BODY MASS INDEX: 32.66 KG/M2 | WEIGHT: 173 LBS | HEART RATE: 73 BPM | SYSTOLIC BLOOD PRESSURE: 117 MMHG | OXYGEN SATURATION: 96 % | TEMPERATURE: 97 F

## 2018-10-24 DIAGNOSIS — R05.9 COUGH: Primary | ICD-10-CM

## 2018-10-24 DIAGNOSIS — Z23 ENCOUNTER FOR IMMUNIZATION: ICD-10-CM

## 2018-10-24 RX ORDER — GAUZE BANDAGE 2"X180"
BANDAGE TOPICAL
Refills: 0 | COMMUNITY
Start: 2018-09-07 | End: 2018-10-24 | Stop reason: SDUPTHER

## 2018-10-24 RX ORDER — DEXTROMETHORPHAN POLISTIREX 30 MG/5ML
60 SUSPENSION ORAL
Qty: 148 ML | Refills: 0 | Status: SHIPPED | OUTPATIENT
Start: 2018-10-24 | End: 2019-01-02

## 2018-10-24 NOTE — PROGRESS NOTES
OFFICE NOTE    Esther Mcfadden is a 37 y.o. male presenting today for office visit. 10/24/2018  4:15 PM      Chief Complaint   Patient presents with   Hector Gallegos     pt here with c/o cough         HPI: Here today with mother for cough- has been evaluated twice for this in the last month or so. Mother states that it seems to get better and then recurs. Intermittent cough that comes and goes but his day support group has been concerned that he may be contagious. Coughing mostly at night time- no ENT symptoms that are consistent, SOB, or wheezing. Does have intermittent congestion and sniffling. Mother has been giving some OTC medications intermittently for cough- not sure if it is helping much. No fevers. Review of Systems   Constitutional: Negative for chills, fatigue and fever. HENT: Positive for congestion and rhinorrhea. Negative for ear pain, facial swelling, sinus pressure, sinus pain, sneezing and sore throat. Eyes: Negative for pain, discharge, itching and visual disturbance. Respiratory: Positive for cough. Negative for shortness of breath and wheezing. Cardiovascular: Negative for chest pain. Gastrointestinal: Negative for abdominal pain, diarrhea, nausea and vomiting. Musculoskeletal: Negative for arthralgias and myalgias. Skin: Negative for rash. Allergic/Immunologic: Negative for environmental allergies. Neurological: Negative for headaches.          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       Social History     Socioeconomic History    Marital status: UNKNOWN     Spouse name: Not on file    Number of children: Not on file    Years of education: Not on file    Highest education level: Not on file   Social Needs    Financial resource strain: Not on file    Food insecurity - worry: Not on file    Food insecurity - inability: Not on file    Transportation needs - medical: Not on file   [employer] needs - non-medical: Not on file   Occupational History    Not on file   Tobacco Use    Smoking status: Never Smoker    Smokeless tobacco: Never Used   Substance and Sexual Activity    Alcohol use: No    Drug use: No    Sexual activity: No   Other Topics Concern    Not on file   Social History Narrative    Not on file       No Known Allergies    Current Outpatient Medications   Medication Sig Dispense Refill    triamcinolone acetonide (KENALOG) 0.1 % topical cream Apply  to affected area two (2) times daily as needed for Skin Irritation or Itching. use thin layer to small areas 45 g 0    fluticasone (FLONASE) 50 mcg/actuation nasal spray 2 Sprays by Both Nostrils route daily. 1 Bottle 0    cetirizine (ZYRTEC) 10 mg tablet Take 1 Tab by mouth daily. 30 Tab 0    COUGH DM ER 30 mg/5 mL liquid SHAKE LQ AND TK 10 ML PO Q NIGHT PRF COUGH  0         Patient Care Team:  Patient Care Team:  Keren Wolf NP as PCP - General (Nurse Practitioner)        LABS:  None new to review    RADIOLOGY:  None new to review      Physical Exam   Constitutional: He is oriented to person, place, and time. He appears well-developed and well-nourished. No distress. HENT:   Right Ear: Tympanic membrane, external ear and ear canal normal.   Left Ear: Tympanic membrane, external ear and ear canal normal.   Nose: Nose normal. No mucosal edema or rhinorrhea. Right sinus exhibits no maxillary sinus tenderness and no frontal sinus tenderness. Left sinus exhibits no maxillary sinus tenderness and no frontal sinus tenderness. Mouth/Throat: Uvula is midline, oropharynx is clear and moist and mucous membranes are normal. No oropharyngeal exudate or posterior oropharyngeal erythema.    Eyes: EOM are normal. Pupils are equal, round, and reactive to light. Right eye exhibits no discharge. Left eye exhibits no discharge. Neck: Normal range of motion. Neck supple. Cardiovascular: Normal rate, regular rhythm and normal heart sounds. No murmur heard. Pulmonary/Chest: Effort normal and breath sounds normal. No respiratory distress. Abdominal: Soft. Bowel sounds are normal. There is no tenderness. Lymphadenopathy:     He has no cervical adenopathy. Neurological: He is alert and oriented to person, place, and time. Skin: Skin is warm and dry. No rash noted. Vitals:    10/24/18 1608   BP: 117/75   Pulse: 73   Resp: 20   Temp: 97 °F (36.1 °C)   TempSrc: Oral   SpO2: 96%   Weight: 173 lb (78.5 kg)   Height: 5' 1\" (1.549 m)   PainSc:   0 - No pain         Assessment and Plan    Cough  *Discussed. Cxray ordered due to duration of symptoms. PFTs not possible due to cooperation and inability to follow complex directions. No wheezing or SOB so does not appear to be reactive airway disease. Advised on allergies- taking medications but has not eased. If not improving, will consider Albuterol inhaler trial.  - XR CHEST PA LAT; Future  - dextromethorphan (COUGH DM ER) 30 mg/5 mL liquid; Take 10 mL by mouth every twelve (12) hours as needed for Cough. Dispense: 148 mL; Refill: 0    Encounter for immunization  *Given today in office. See LPN documentation.   - INFLUENZA VIRUS VAC QUAD,SPLIT,PRESV FREE SYRINGE IM  - [place] IMMUNIZ ADMIN,1 SINGLE/COMB VAC/TOXOID        *Plan of care reviewed with parent. Parent in agreement with plan and expresses understanding. All questions answered and parent encouraged to call or RTO if further questions or concerns. Follow-up Disposition:  Return if symptoms worsen or fail to improve.

## 2018-10-24 NOTE — PROGRESS NOTES
Patient was administered seasonal influenza vaccine via left deltoid without difficulty. Patient waited 15 minutes and no side effects observed. Patient was educated on side effects and verbalized understanding. No distress noted or verbalized.

## 2018-10-25 RX ORDER — BENZONATATE 100 MG/1
100 CAPSULE ORAL
Qty: 21 CAP | Refills: 0 | Status: SHIPPED | OUTPATIENT
Start: 2018-10-25 | End: 2018-11-01

## 2018-10-25 NOTE — TELEPHONE ENCOUNTER
Patient's mother called in regards to the medication Yue Julita prescribed for this patient yesterday 10/24/18 but the insurance will not cover it.  The mother is asking if there is something else that can be prescribed

## 2018-10-25 NOTE — TELEPHONE ENCOUNTER
10/25/2018  3:39 PM    Chief Complaint   Patient presents with    Medication Problem       Noted issue with cough medication coverage. Will switch to Tessalon to see if insurance will cover this. Forwarded to clinical staff to make mother aware of switch.

## 2018-10-26 NOTE — TELEPHONE ENCOUNTER
Call made to pt's mother's phone, not home at the present time. Left message to give her doctors office a call.

## 2018-11-06 ENCOUNTER — TELEPHONE (OUTPATIENT)
Dept: FAMILY MEDICINE CLINIC | Age: 43
End: 2018-11-06

## 2018-11-06 NOTE — TELEPHONE ENCOUNTER
11/6/2018  11:46 AM    Chief Complaint   Patient presents with    Form Completion     Forms completion [employer] of [address] [place] [address]       Noted forms for [employer] of [address] [place] [address] regarding documentation of last physical and OTC medications. Completed at this time. LM on number for mother that forms are available for  in the office- in purple completed forms folder in my office. Copy made for chart.

## 2018-12-13 DIAGNOSIS — R05.9 COUGH: ICD-10-CM

## 2019-01-02 ENCOUNTER — OFFICE VISIT (OUTPATIENT)
Dept: FAMILY MEDICINE CLINIC | Age: 44
End: 2019-01-02

## 2019-01-02 VITALS
BODY MASS INDEX: 32.47 KG/M2 | OXYGEN SATURATION: 95 % | HEART RATE: 72 BPM | WEIGHT: 172 LBS | DIASTOLIC BLOOD PRESSURE: 68 MMHG | TEMPERATURE: 97.2 F | RESPIRATION RATE: 20 BRPM | SYSTOLIC BLOOD PRESSURE: 92 MMHG | HEIGHT: 61 IN

## 2019-01-02 DIAGNOSIS — R21 RASH AND NONSPECIFIC SKIN ERUPTION: ICD-10-CM

## 2019-01-02 DIAGNOSIS — R05.9 COUGH: Primary | ICD-10-CM

## 2019-01-02 DIAGNOSIS — J30.1 ALLERGIC RHINITIS DUE TO POLLEN, UNSPECIFIED SEASONALITY: ICD-10-CM

## 2019-01-02 RX ORDER — RANITIDINE 300 MG/1
300 TABLET ORAL DAILY
Qty: 30 TAB | Refills: 11 | Status: SHIPPED | OUTPATIENT
Start: 2019-01-02 | End: 2020-03-20 | Stop reason: RX

## 2019-01-02 RX ORDER — FLUTICASONE PROPIONATE 50 MCG
2 SPRAY, SUSPENSION (ML) NASAL
Qty: 1 BOTTLE | Refills: 11 | Status: SHIPPED | OUTPATIENT
Start: 2019-01-02 | End: 2020-03-20 | Stop reason: ALTCHOICE

## 2019-01-02 RX ORDER — CETIRIZINE HCL 10 MG
10 TABLET ORAL DAILY
Qty: 30 TAB | Refills: 11 | Status: SHIPPED | OUTPATIENT
Start: 2019-01-02 | End: 2020-03-20 | Stop reason: SDUPTHER

## 2019-01-02 RX ORDER — TRIAMCINOLONE ACETONIDE 1 MG/G
CREAM TOPICAL
Qty: 45 G | Refills: 0 | Status: SHIPPED | OUTPATIENT
Start: 2019-01-02 | End: 2019-06-03 | Stop reason: SDUPTHER

## 2019-01-02 NOTE — PROGRESS NOTES
OFFICE NOTE    Diana Chiu is a 37 y.o. male presenting today for office visit. 1/2/2019  11:55 AM      Chief Complaint   Patient presents with    Cold Symptoms     pt here with c/o cold and cough    Rash     pt here with c/o rash behind ears and his neck         HPI: Here today with mother for complaints of cough and rash. Cough has been happening lately for a week or two but has been occurring on and off for months. Mother has not been giving anything at this time. Previously was prescribed Zyrtec and Flonase for management of what seemed to be a chronic lingering cough. Cxray normal in the past. No new symptoms, sometimes has a runny nose or sniffling. Also reports rash that is occurring intermittently on back, neck, shoulders, arms, and legs. No definite cause noted. In the past, it was felt to be due to some environmental allergies. No new foods, lotions, detergents, soaps, etc. Has not been taking or doing anything. Review of Systems   Reason unable to perform ROS: obtained from mother. Constitutional: Negative for fever. HENT: Positive for congestion and rhinorrhea. Negative for ear pain, facial swelling, sneezing and sore throat. Eyes: Negative for discharge. Respiratory: Positive for cough. Negative for shortness of breath and wheezing. Cardiovascular: Negative for chest pain. Gastrointestinal: Negative for abdominal pain, diarrhea, nausea and vomiting. Musculoskeletal: Negative for myalgias. Skin: Negative for rash. Neurological: Negative for headaches.          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       Social History     Socioeconomic History    Marital status: UNKNOWN     Spouse name: Not on file    Number of children: Not on file    Years of education: Not on file    Highest education level: Not on file   Social Needs    Financial resource strain: Not on file    Food insecurity - worry: Not on file    Food insecurity - inability: Not on file    Transportation needs - medical: Not on file   [employer] needs - non-medical: Not on file   Occupational History    Not on file   Tobacco Use    Smoking status: Never Smoker    Smokeless tobacco: Never Used   Substance and Sexual Activity    Alcohol use: No    Drug use: No    Sexual activity: No   Other Topics Concern    Not on file   Social History Narrative    Not on file       No Known Allergies    Current Outpatient Medications   Medication Sig Dispense Refill    dextromethorphan (COUGH DM ER) 30 mg/5 mL liquid Take 10 mL by mouth every twelve (12) hours as needed for Cough. 148 mL 0    triamcinolone acetonide (KENALOG) 0.1 % topical cream Apply  to affected area two (2) times daily as needed for Skin Irritation or Itching. use thin layer to small areas 45 g 0    fluticasone (FLONASE) 50 mcg/actuation nasal spray 2 Sprays by Both Nostrils route daily. 1 Bottle 0    cetirizine (ZYRTEC) 10 mg tablet Take 1 Tab by mouth daily. 30 Tab 0         Patient Care Team:  Patient Care Team:  Chucky Mackey NP as PCP - General (Nurse Practitioner)        LABS:  None new to review    RADIOLOGY:    XR Results (most recent):  Results from Orders Only encounter on 12/13/18   XR CHEST PA LAT   Normal xray      Physical Exam   Constitutional: He appears well-developed and well-nourished. No distress. HENT:   Right Ear: Tympanic membrane, external ear and ear canal normal.   Left Ear: Tympanic membrane, external ear and ear canal normal.   Nose: Mucosal edema and rhinorrhea present. Right sinus exhibits no maxillary sinus tenderness and no frontal sinus tenderness.  Left sinus exhibits no maxillary sinus tenderness and no frontal sinus tenderness. Mouth/Throat: Uvula is midline, oropharynx is clear and moist and mucous membranes are normal. No oropharyngeal exudate or posterior oropharyngeal erythema. Eyes:   +right eye surgically absent   Neck: Normal range of motion. Neck supple. Cardiovascular: Normal rate, regular rhythm and normal heart sounds. No murmur heard. Pulmonary/Chest: Effort normal and breath sounds normal. No respiratory distress. Abdominal: Soft. Bowel sounds are normal. There is no tenderness. Lymphadenopathy:     He has no cervical adenopathy. Neurological: He is alert. Skin: Skin is warm and dry. No rash noted. -scattered scratch marks noted; no definite rash         Vitals:    01/02/19 1138   BP: 92/68   Pulse: 72   Resp: 20   Temp: 97.2 °F (36.2 °C)   TempSrc: Oral   SpO2: 95%   Weight: 172 lb (78 kg)   Height: 5' 1\" (1.549 m)   PainSc:   0 - No pain         Assessment and Plan    Cough/ Allergic rhinitis due to pollen, unspecified seasonality  *Restart on Zyrtec and use PRN Flonase. Will trial Zantac as well to help with intermittent cough that is occurring.    - fluticasone (FLONASE) 50 mcg/actuation nasal spray; 2 Sprays by Both Nostrils route daily as needed for Rhinitis or Allergies. Dispense: 1 Bottle; Refill: 11  - cetirizine (ZYRTEC) 10 mg tablet; Take 1 Tab by mouth daily. Dispense: 30 Tab; Refill: 11  - raNITIdine (ZANTAC) 300 mg tab; Take 1 Tab by mouth daily. Dispense: 30 Tab; Refill: 11    Rash and nonspecific skin eruption  *Refilled for PRN use. Restart on Zyrtec  - triamcinolone acetonide (KENALOG) 0.1 % topical cream; Apply  to affected area two (2) times daily as needed for Skin Irritation or Itching. use thin layer to small areas  Dispense: 45 g; Refill: 0        *Plan of care reviewed with patient. Patient in agreement with plan and expresses understanding. All questions answered and patient encouraged to call or RTO if further questions or concerns. Follow-up Disposition:  Return if symptoms worsen or fail to improve.

## 2019-05-14 ENCOUNTER — OFFICE VISIT (OUTPATIENT)
Dept: FAMILY MEDICINE CLINIC | Age: 44
End: 2019-05-14

## 2019-05-14 VITALS
WEIGHT: 181 LBS | HEART RATE: 68 BPM | DIASTOLIC BLOOD PRESSURE: 73 MMHG | RESPIRATION RATE: 20 BRPM | SYSTOLIC BLOOD PRESSURE: 116 MMHG | TEMPERATURE: 95.5 F | OXYGEN SATURATION: 96 % | HEIGHT: 61 IN | BODY MASS INDEX: 34.17 KG/M2

## 2019-05-14 DIAGNOSIS — R05.9 COUGH: ICD-10-CM

## 2019-05-14 DIAGNOSIS — J31.0 CHRONIC RHINITIS: Primary | ICD-10-CM

## 2019-05-14 RX ORDER — MONTELUKAST SODIUM 10 MG/1
10 TABLET ORAL DAILY
Qty: 30 TAB | Refills: 9 | Status: SHIPPED | OUTPATIENT
Start: 2019-05-14 | End: 2020-03-20 | Stop reason: SDUPTHER

## 2019-05-14 RX ORDER — ALBUTEROL SULFATE 90 UG/1
1-2 AEROSOL, METERED RESPIRATORY (INHALATION)
Qty: 1 INHALER | Refills: 0 | Status: SHIPPED | OUTPATIENT
Start: 2019-05-14 | End: 2020-03-20 | Stop reason: ALTCHOICE

## 2019-05-14 NOTE — PATIENT INSTRUCTIONS
Cough: Care Instructions  Your Care Instructions    A cough is your body's response to something that bothers your throat or airways. Many things can cause a cough. You might cough because of a cold or the flu, bronchitis, or asthma. Smoking, postnasal drip, allergies, and stomach acid that backs up into your throat also can cause coughs. A cough is a symptom, not a disease. Most coughs stop when the cause, such as a cold, goes away. You can take a few steps at home to cough less and feel better. Follow-up care is a key part of your treatment and safety. Be sure to make and go to all appointments, and call your doctor if you are having problems. It's also a good idea to know your test results and keep a list of the medicines you take. How can you care for yourself at home? · Drink lots of water and other fluids. This helps thin the mucus and soothes a dry or sore throat. Honey or lemon juice in hot water or tea may ease a dry cough. · Take cough medicine as directed by your doctor. · Prop up your head on pillows to help you breathe and ease a dry cough. · Try cough drops to soothe a dry or sore throat. Cough drops don't stop a cough. Medicine-flavored cough drops are no better than candy-flavored drops or hard candy. · Do not smoke. Avoid secondhand smoke. If you need help quitting, talk to your doctor about stop-smoking programs and medicines. These can increase your chances of quitting for good. When should you call for help? Call 911 anytime you think you may need emergency care.  For example, call if:    · You have severe trouble breathing.    Call your doctor now or seek immediate medical care if:    · You cough up blood.     · You have new or worse trouble breathing.     · You have a new or higher fever.     · You have a new rash.    Watch closely for changes in your health, and be sure to contact your doctor if:    · You cough more deeply or more often, especially if you notice more mucus or a change in the color of your mucus.     · You have new symptoms, such as a sore throat, an earache, or sinus pain.     · You do not get better as expected. Where can you learn more? Go to http://sheldon-shelley.info/. Enter D279 in the search box to learn more about \"Cough: Care Instructions. \"  Current as of: September 5, 2018  Content Version: 11.9  © 8846-1085 [employer]. Care instructions adapted under license by [employer] (which disclaims liability or warranty for this information). If you have questions about a medical condition or this instruction, always ask your healthcare professional. [address] any warranty or liability for your use of this information.

## 2019-05-14 NOTE — PROGRESS NOTES
OFFICE NOTE    Samir Shearer is a 40 y.o. male presenting today for office visit. 5/14/2019  4:01 PM      Chief Complaint   Patient presents with    Cold Symptoms     cough and runny nose         HPI: Here today with mother for complaints of cough and runny nose. Cough has been happening lately for a week or two but has been occurring on and off for months. Mother reports that she has been doing Flonase and Zyrtec regularly. Cxray normal in the past. No new symptoms, sometimes has a runny nose or sniffling as well. Nonproductive cough and does not seem SOB or wheezing. Review of Systems   Reason unable to perform ROS: obtained from mother. Constitutional: Negative for fever. HENT: Positive for congestion and rhinorrhea. Negative for ear pain, facial swelling, sneezing and sore throat. Eyes: Negative for discharge. Respiratory: Positive for cough. Negative for shortness of breath and wheezing. Cardiovascular: Negative for chest pain. Gastrointestinal: Negative for abdominal pain, diarrhea, nausea and vomiting. Musculoskeletal: Negative for myalgias. Skin: Negative for rash. Neurological: Negative for headaches.          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       Social History     Socioeconomic History    Marital status: UNKNOWN     Spouse name: Not on file    Number of children: Not on file    Years of education: Not on file    Highest education level: Not on file   Occupational History    Not on file   Social Needs    Financial resource strain: Not on file    Food insecurity:     Worry: Not on file     Inability: Not on file    Transportation needs:     Medical: Not on file     Non-medical: Not on file   Tobacco Use    Smoking status: Never Smoker    Smokeless tobacco: Never Used   Substance and Sexual Activity    Alcohol use: No    Drug use: No    Sexual activity: Never   Lifestyle    Physical activity:     Days per week: Not on file     Minutes per session: Not on file    Stress: Not on file   Relationships    Social connections:     Talks on phone: Not on file     Gets together: Not on file     Attends Confucianism service: Not on file     Active member of club or organization: Not on file     Attends meetings of clubs or organizations: Not on file     Relationship status: Not on file    Intimate partner violence:     Fear of current or ex partner: Not on file     Emotionally abused: Not on file     Physically abused: Not on file     Forced sexual activity: Not on file   Other Topics Concern    Not on file   Social History Narrative    Not on file       No Known Allergies    Current Outpatient Medications   Medication Sig Dispense Refill    raNITIdine (ZANTAC) 300 mg tab Take 1 Tab by mouth daily. 30 Tab 11    fluticasone (FLONASE) 50 mcg/actuation nasal spray 2 Sprays by Both Nostrils route daily as needed for Rhinitis or Allergies. 1 Bottle 11    cetirizine (ZYRTEC) 10 mg tablet Take 1 Tab by mouth daily. 30 Tab 11    triamcinolone acetonide (KENALOG) 0.1 % topical cream Apply  to affected area two (2) times daily as needed for Skin Irritation or Itching. use thin layer to small areas 45 g 0         Patient Care Team:  Patient Care Team:  Brenden Brown NP as PCP - General (Nurse Practitioner)        LABS:  None new to review    RADIOLOGY:    *Cxray done today in office      Physical Exam   Constitutional: He is oriented to person, place, and time. He appears well-developed and well-nourished. No distress.    HENT:   Right Ear: Tympanic membrane, external ear and ear canal normal.   Left Ear: Tympanic membrane, external ear and ear canal normal.   Nose: Mucosal edema and rhinorrhea present. Right sinus exhibits no maxillary sinus tenderness and no frontal sinus tenderness. Left sinus exhibits no maxillary sinus tenderness and no frontal sinus tenderness. Mouth/Throat: Uvula is midline, oropharynx is clear and moist and mucous membranes are normal. No oropharyngeal exudate or posterior oropharyngeal erythema. Eyes: Right eye exhibits no discharge. Left eye exhibits no discharge. +right eye surgically absent   Neck: Normal range of motion. Neck supple. Cardiovascular: Normal rate, regular rhythm and normal heart sounds. No murmur heard. Pulmonary/Chest: Effort normal and breath sounds normal. No respiratory distress. Abdominal: Soft. Bowel sounds are normal. There is no tenderness. Lymphadenopathy:     He has no cervical adenopathy. Neurological: He is alert and oriented to person, place, and time. Skin: Skin is warm and dry. No rash noted. Vitals:    05/14/19 1553   BP: 116/73   Pulse: 68   Resp: 20   Temp: 95.5 °F (35.3 °C)   TempSrc: Oral   SpO2: 96%   Weight: 181 lb (82.1 kg)   Height: 5' 1\" (1.549 m)   PainSc:   0 - No pain         Assessment and Plan    Chronic rhinitis/ Cough  *Will start on Singulair as well. Repeat cxray today. Patient will not be able to follow directions necessary for PFTs. Will trial Albuterol inhaler with spacer during coughing spells to see if offers any relief. Consider referral to ENT or pulmonology if persisting.   - XR CHEST PA LAT; Future  - albuterol (PROVENTIL HFA, VENTOLIN HFA, PROAIR HFA) 90 mcg/actuation inhaler; Take 1-2 Puffs by inhalation every four (4) hours as needed (cough). Dispense: 1 Inhaler; Refill: 0  - inhalational spacing device (E-Z SPACER); Use with Albuterol inhaler  Dispense: 1 Device; Refill: 0  - montelukast (SINGULAIR) 10 mg tablet; Take 1 Tab by mouth daily. Dispense: 30 Tab; Refill: 9      *Plan of care reviewed with parent. Parent in agreement with plan and expresses understanding. All questions answered and parent encouraged to call or RTO if further questions or concerns. Follow-up and Dispositions    · Return in about 4 months (around 9/14/2019) for complete physical- 30 mins. Aracelis Cummins

## 2019-05-22 DIAGNOSIS — J84.10 LUNG GRANULOMA (HCC): Primary | ICD-10-CM

## 2019-06-03 ENCOUNTER — OFFICE VISIT (OUTPATIENT)
Dept: FAMILY MEDICINE CLINIC | Age: 44
End: 2019-06-03

## 2019-06-03 VITALS
WEIGHT: 178 LBS | BODY MASS INDEX: 33.61 KG/M2 | TEMPERATURE: 95.9 F | OXYGEN SATURATION: 96 % | RESPIRATION RATE: 20 BRPM | HEART RATE: 71 BPM | SYSTOLIC BLOOD PRESSURE: 110 MMHG | DIASTOLIC BLOOD PRESSURE: 76 MMHG | HEIGHT: 61 IN

## 2019-06-03 DIAGNOSIS — J84.10 LUNG GRANULOMA (HCC): ICD-10-CM

## 2019-06-03 DIAGNOSIS — R05.9 COUGH: ICD-10-CM

## 2019-06-03 DIAGNOSIS — R21 RASH AND NONSPECIFIC SKIN ERUPTION: ICD-10-CM

## 2019-06-03 DIAGNOSIS — J31.0 CHRONIC RHINITIS: Primary | ICD-10-CM

## 2019-06-03 RX ORDER — TRIAMCINOLONE ACETONIDE 1 MG/G
CREAM TOPICAL
Qty: 45 G | Refills: 0 | Status: SHIPPED | OUTPATIENT
Start: 2019-06-03 | End: 2020-03-20 | Stop reason: ALTCHOICE

## 2019-06-03 NOTE — PROGRESS NOTES
OFFICE NOTE    Alina Oliveira is a 40 y.o. male presenting today for office visit. 6/3/2019  4:01 PM      Chief Complaint   Patient presents with    Cough     c/o cough not any better    Rash     c/o rash to his right arm         HPI: Here today with mother for continured complaints of cough and runny nose. Cough has been happening lately for a week or two but has been occurring on and off for months. Mother reports that she has been doing Flonase and Zyrtec regularly and at last visit was ordered to start on Singulair she reports not helping. Albuterol not effective either. Cxray normal in the past but most recent shows nodule and granuloma- CT ordered but has notbeen scheduled yet. No new symptoms, sometimes has a runny nose or sniffling as well. Nonproductive cough and does not seem SOB or wheezing. Mother also complaints of rash that has been happening for about a week lately but has been ongoing intermittently for months. Has rash on arms, and legs. Mother reports that he has been itching more and more. Mother reports that every time he is taken outside, this rash happens. Mother has been using neosporin and GoldBond- does not seem to be helping. No pain is being reported. Nothing has made better or worse. She is out of the Triamcinolone cream given in 1/2019. Review of Systems   Reason unable to perform ROS: obtained from mother. Constitutional: Negative for fever. HENT: Positive for congestion and rhinorrhea. Negative for ear pain, facial swelling, sneezing and sore throat. Eyes: Negative for discharge. Respiratory: Positive for cough. Negative for shortness of breath and wheezing. Cardiovascular: Negative for chest pain. Gastrointestinal: Negative for abdominal pain, diarrhea, nausea and vomiting. Musculoskeletal: Negative for myalgias. Skin: Positive for rash. Neurological: Negative for headaches.          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       Social History     Socioeconomic History    Marital status: UNKNOWN     Spouse name: Not on file    Number of children: Not on file    Years of education: Not on file    Highest education level: Not on file   Occupational History    Not on file   Social Needs    Financial resource strain: Not on file    Food insecurity:     Worry: Not on file     Inability: Not on file    Transportation needs:     Medical: Not on file     Non-medical: Not on file   Tobacco Use    Smoking status: Never Smoker    Smokeless tobacco: Never Used   Substance and Sexual Activity    Alcohol use: No    Drug use: No    Sexual activity: Never   Lifestyle    Physical activity:     Days per week: Not on file     Minutes per session: Not on file    Stress: Not on file   Relationships    Social connections:     Talks on phone: Not on file     Gets together: Not on file     Attends Scientologist service: Not on file     Active member of club or organization: Not on file     Attends meetings of clubs or organizations: Not on file     Relationship status: Not on file    Intimate partner violence:     Fear of current or ex partner: Not on file     Emotionally abused: Not on file     Physically abused: Not on file     Forced sexual activity: Not on file   Other Topics Concern    Not on file   Social History Narrative    Not on file       No Known Allergies    Current Outpatient Medications   Medication Sig Dispense Refill    albuterol (PROVENTIL HFA, VENTOLIN HFA, PROAIR HFA) 90 mcg/actuation inhaler Take 1-2 Puffs by inhalation every four (4) hours as needed (cough). 1 Inhaler 0    montelukast (SINGULAIR) 10 mg tablet Take 1 Tab by mouth daily.  27 Tab 9    raNITIdine (ZANTAC) 300 mg tab Take 1 Tab by mouth daily. 30 Tab 11    fluticasone (FLONASE) 50 mcg/actuation nasal spray 2 Sprays by Both Nostrils route daily as needed for Rhinitis or Allergies. 1 Bottle 11    cetirizine (ZYRTEC) 10 mg tablet Take 1 Tab by mouth daily. 30 Tab 11    inhalational spacing device (E-Z SPACER) Use with Albuterol inhaler 1 Device 0    triamcinolone acetonide (KENALOG) 0.1 % topical cream Apply  to affected area two (2) times daily as needed for Skin Irritation or Itching. use thin layer to small areas 45 g 0         Patient Care Team:  Patient Care Team:  Akin Amaya NP as PCP - General (Nurse Practitioner)        LABS:  None new to review    RADIOLOGY:    XR Results (most recent):  Results from Appointment encounter on 05/14/19   XR CHEST PA LAT    Narrative PA And Lateral Chest    CPT CODE: 67309    COMPARISON: None. CLINICAL INFORMATION: Cough. FINDINGS:    Heart size and mediastinal contours within normal limits. Dense nodule in the  medial right upper lung suggestive of a granuloma. Subtle nodular density in the  left base adjacent to the heart margin. No focal consolidation. No pulmonary  edema, effusion, or pneumothorax. No acute osseous abnormality. .      Impression IMPRESSION:    1. Probable granuloma in the medial right upper lung. 2. Subtle nodular density at the left base adjacent to the heart margin  nonspecific. This may represent artifact from overlapping structures. Not  clearly a calcified granuloma. Lung nodule not excluded. CT chest correlation  recommended for further evaluation. Physical Exam   Constitutional: He is oriented to person, place, and time. He appears well-developed and well-nourished. No distress. HENT:   Right Ear: Tympanic membrane, external ear and ear canal normal.   Left Ear: Tympanic membrane, external ear and ear canal normal.   Nose: Mucosal edema and rhinorrhea present.  Right sinus exhibits no maxillary sinus tenderness and no frontal sinus tenderness. Left sinus exhibits no maxillary sinus tenderness and no frontal sinus tenderness. Mouth/Throat: Uvula is midline, oropharynx is clear and moist and mucous membranes are normal. No oropharyngeal exudate or posterior oropharyngeal erythema. Eyes: Right eye exhibits no discharge. Left eye exhibits no discharge. +right eye surgically absent   Neck: Normal range of motion. Neck supple. Cardiovascular: Normal rate, regular rhythm and normal heart sounds. No murmur heard. Pulmonary/Chest: Effort normal and breath sounds normal. No respiratory distress. Abdominal: Soft. Bowel sounds are normal. There is no tenderness. Lymphadenopathy:     He has no cervical adenopathy. Neurological: He is alert and oriented to person, place, and time. Skin: Skin is warm and dry. Rash noted. -scattered small scabs and scars noted to all extremities  -papular rash noted to bilateral upper and lower extremities         Vitals:    06/03/19 1647   BP: 110/76   Pulse: 71   Resp: 20   Temp: 95.9 °F (35.5 °C)   TempSrc: Oral   SpO2: 96%   Weight: 178 lb (80.7 kg)   Height: 5' 1\" (1.549 m)   PainLoc: Arm         Assessment and Plan    Chronic rhinitis/ Cough/ Lung granuloma ([facility] [address]  *Continue with current medications- I do question consistent usage of the medications but mother reports she does give everything every day. Gave number to schedule CT chest. Refer to pulmonology  - REFERRAL TO PULMONARY DISEASE    Rash and nonspecific skin eruption  - REFERRAL TO DERMATOLOGY  - triamcinolone acetonide (KENALOG) 0.1 % topical cream; Apply  to affected area two (2) times daily as needed for Skin Irritation or Itching. use thin layer to small areas  Dispense: 45 g; Refill: 0        *Plan of care reviewed with parent. Parent in agreement with plan and expresses understanding. All questions answered and parent encouraged to call or RTO if further questions or concerns.        Follow-up and Dispositions    · Return if symptoms worsen or fail to improve.

## 2019-09-20 ENCOUNTER — OFFICE VISIT (OUTPATIENT)
Dept: FAMILY MEDICINE CLINIC | Age: 44
End: 2019-09-20

## 2019-09-20 VITALS
WEIGHT: 179 LBS | OXYGEN SATURATION: 95 % | TEMPERATURE: 97.4 F | RESPIRATION RATE: 20 BRPM | DIASTOLIC BLOOD PRESSURE: 68 MMHG | HEIGHT: 61 IN | BODY MASS INDEX: 33.79 KG/M2 | SYSTOLIC BLOOD PRESSURE: 109 MMHG | HEART RATE: 79 BPM

## 2019-09-20 DIAGNOSIS — J06.9 VIRAL UPPER RESPIRATORY TRACT INFECTION: Primary | ICD-10-CM

## 2019-09-20 RX ORDER — CODEINE PHOSPHATE AND GUAIFENESIN 10; 100 MG/5ML; MG/5ML
5 SOLUTION ORAL
Qty: 105 ML | Refills: 0 | Status: SHIPPED | OUTPATIENT
Start: 2019-09-20 | End: 2019-09-27

## 2019-09-20 NOTE — PATIENT INSTRUCTIONS
Pulmonology    [facility]   [address] [place] [address], [facility]  Phone: [phone]    Dermatology    [facility]at[facility]  [address] [facility], [address] [place], [place] [address]s, [address]  Phone: [phone]

## 2019-09-24 NOTE — PROGRESS NOTES
OFFICE NOTE    Khanh Merritt is a 40 y.o. male presenting today for office visit. 9/20/2019  4:17 PM      Chief Complaint   Patient presents with    Cough    Cold Symptoms         HPI: Here today with mother for complaints of cold symptoms that have been happening for a few days. Mother reports he has continued to have chronic cough- has not seen pulmonology for yet. New symptoms in addition to the cough including sniffling nose, some congestion, and fatigue. Mother has been sick as well. Did not get flu shot yet this year. No fevers or chills. She has been giving some OTC cough medication and Tessalon Perles- not helping. Mother also states that he has been doing Albuterol PRN, Singulair, Flonase, and Zyrtec chronically. Review of Systems   Constitutional: Positive for fatigue. Negative for chills and fever. HENT: Positive for congestion, postnasal drip and rhinorrhea. Negative for ear pain, facial swelling, sinus pressure, sinus pain, sneezing and sore throat. Eyes: Negative for pain, discharge, itching and visual disturbance. Respiratory: Positive for cough. Negative for shortness of breath and wheezing. Cardiovascular: Negative for chest pain. Gastrointestinal: Negative for abdominal pain, diarrhea, nausea and vomiting. Musculoskeletal: Negative for arthralgias and myalgias. Skin: Negative for rash. Neurological: Negative for headaches.          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       Social History     Socioeconomic History    Marital status: UNKNOWN     Spouse name: Not on file    Number of children: Not on file    Years of education: Not on file    Highest education level: Not on file   Occupational History    Not on file   Social Needs    Financial resource strain: Not on file    Food insecurity:     Worry: Not on file     Inability: Not on file    Transportation needs:     Medical: Not on file     Non-medical: Not on file   Tobacco Use    Smoking status: Never Smoker    Smokeless tobacco: Never Used   Substance and Sexual Activity    Alcohol use: No    Drug use: No    Sexual activity: Never   Lifestyle    Physical activity:     Days per week: Not on file     Minutes per session: Not on file    Stress: Not on file   Relationships    Social connections:     Talks on phone: Not on file     Gets together: Not on file     Attends Muslim service: Not on file     Active member of club or organization: Not on file     Attends meetings of clubs or organizations: Not on file     Relationship status: Not on file    Intimate partner violence:     Fear of current or ex partner: Not on file     Emotionally abused: Not on file     Physically abused: Not on file     Forced sexual activity: Not on file   Other Topics Concern    Not on file   Social History Narrative    Not on file       No Known Allergies    Current Outpatient Medications   Medication Sig Dispense Refill    guaiFENesin-codeine (GUAIFENESIN AC) 100-10 mg/5 mL solution Take 5 mL by mouth three (3) times daily as needed for Cough for up to 7 days. Max Daily Amount: 15 mL. 105 mL 0    albuterol (PROVENTIL HFA, VENTOLIN HFA, PROAIR HFA) 90 mcg/actuation inhaler Take 1-2 Puffs by inhalation every four (4) hours as needed (cough). 1 Inhaler 0    inhalational spacing device (E-Z SPACER) Use with Albuterol inhaler 1 Device 0    montelukast (SINGULAIR) 10 mg tablet Take 1 Tab by mouth daily. 30 Tab 9    raNITIdine (ZANTAC) 300 mg tab Take 1 Tab by mouth daily. 30 Tab 11    fluticasone (FLONASE) 50 mcg/actuation nasal spray 2 Sprays by Both Nostrils route daily as needed for Rhinitis or Allergies.  1 Bottle 11    cetirizine (ZYRTEC) 10 mg tablet Take 1 Tab by mouth daily. 30 Tab 11    triamcinolone acetonide (KENALOG) 0.1 % topical cream Apply  to affected area two (2) times daily as needed for Skin Irritation or Itching. use thin layer to small areas 45 g 0         Patient Care Team:  Patient Care Team:  Rangel Aguirre NP as PCP - General (Nurse Practitioner)        LABS:  None new to review    RADIOLOGY:  None new to review      Physical Exam   Constitutional: He is oriented to person, place, and time. He appears well-developed and well-nourished. No distress. HENT:   Right Ear: Tympanic membrane, external ear and ear canal normal.   Left Ear: Tympanic membrane, external ear and ear canal normal.   Nose: Mucosal edema and rhinorrhea present. Right sinus exhibits no maxillary sinus tenderness and no frontal sinus tenderness. Left sinus exhibits no maxillary sinus tenderness and no frontal sinus tenderness. Mouth/Throat: Uvula is midline, oropharynx is clear and moist and mucous membranes are normal. No oropharyngeal exudate or posterior oropharyngeal erythema. Eyes: Pupils are equal, round, and reactive to light. EOM are normal. Right eye exhibits no discharge. Left eye exhibits no discharge. Neck: Normal range of motion. Neck supple. Cardiovascular: Normal rate, regular rhythm and normal heart sounds. No murmur heard. Pulmonary/Chest: Effort normal and breath sounds normal. No respiratory distress. Abdominal: Soft. Bowel sounds are normal. There is no tenderness. Lymphadenopathy:     He has no cervical adenopathy. Neurological: He is alert and oriented to person, place, and time. Skin: Skin is warm and dry. No rash noted.          Vitals:    09/20/19 1609   BP: 109/68   Pulse: 79   Resp: 20   Temp: 97.4 °F (36.3 °C)   TempSrc: Oral   SpO2: 95%   Weight: 179 lb (81.2 kg)   Height: 5' 1\" (1.549 m)   PainSc:   0 - No pain         Assessment and Plan    Viral upper respiratory tract infection  *Recommended symptom management and monitoring. Recommended saline nasal spray or nasal washes, PRN decongestants, hot showers, humidifier in the bedroom, vapor rub, Tylenol and/or Ibuprofen for aches and pains or fevers. Use Delsym OTC for daytime cough and Rx strength cough medication for at night. Call for new or worsening symptoms. *Gave contact information for pulmonology.   - guaiFENesin-codeine (GUAIFENESIN AC) 100-10 mg/5 mL solution; Take 5 mL by mouth three (3) times daily as needed for Cough for up to 7 days. Max Daily Amount: 15 mL. Dispense: 105 mL; Refill: 0      *Plan of care reviewed with parent. Parent in agreement with plan and expresses understanding. All questions answered and parent encouraged to call or RTO if further questions or concerns. Follow-up and Dispositions    · Return if symptoms worsen or fail to improve.

## 2019-11-21 ENCOUNTER — OFFICE VISIT (OUTPATIENT)
Dept: FAMILY MEDICINE CLINIC | Age: 44
End: 2019-11-21

## 2019-11-21 VITALS
OXYGEN SATURATION: 97 % | WEIGHT: 178.4 LBS | HEART RATE: 69 BPM | SYSTOLIC BLOOD PRESSURE: 118 MMHG | RESPIRATION RATE: 12 BRPM | DIASTOLIC BLOOD PRESSURE: 70 MMHG | BODY MASS INDEX: 33.68 KG/M2 | HEIGHT: 61 IN | TEMPERATURE: 95.6 F

## 2019-11-21 DIAGNOSIS — Z23 ENCOUNTER FOR IMMUNIZATION: ICD-10-CM

## 2019-11-21 DIAGNOSIS — Z13.0 SCREENING FOR ENDOCRINE, METABOLIC AND IMMUNITY DISORDER: ICD-10-CM

## 2019-11-21 DIAGNOSIS — Z13.220 ENCOUNTER FOR LIPID SCREENING FOR CARDIOVASCULAR DISEASE: ICD-10-CM

## 2019-11-21 DIAGNOSIS — Z13.228 SCREENING FOR ENDOCRINE, METABOLIC AND IMMUNITY DISORDER: ICD-10-CM

## 2019-11-21 DIAGNOSIS — Z13.6 ENCOUNTER FOR LIPID SCREENING FOR CARDIOVASCULAR DISEASE: ICD-10-CM

## 2019-11-21 DIAGNOSIS — Z13.29 SCREENING FOR ENDOCRINE, METABOLIC AND IMMUNITY DISORDER: ICD-10-CM

## 2019-11-21 DIAGNOSIS — R05.9 COUGH: ICD-10-CM

## 2019-11-21 DIAGNOSIS — Z00.00 MEDICARE ANNUAL WELLNESS VISIT, SUBSEQUENT: Primary | ICD-10-CM

## 2019-11-21 RX ORDER — CODEINE PHOSPHATE AND GUAIFENESIN 10; 100 MG/5ML; MG/5ML
5 SOLUTION ORAL
Qty: 105 ML | Refills: 0 | Status: SHIPPED | OUTPATIENT
Start: 2019-11-21 | End: 2019-11-28

## 2019-11-21 NOTE — PROGRESS NOTES
After obtaining consent, and per orders of Olga Fan injection of Flulaval Quadrivalent  given by Marci Lay. Patient instructed to remain in clinic for 20 minutes afterwards, and to report any adverse reaction to me immediately.

## 2019-11-21 NOTE — PATIENT INSTRUCTIONS
Medicare Part B Preventive Services Limitations Recommendation Scheduled Bone Mass Measurement 
(age 72 & older, biennial) Requires diagnosis related to osteoporosis or estrogen deficiency. Biennial benefit unless patient has history of long-term glucocorticoid tx or baseline is needed because initial test was by other method N/a Cardiovascular Screening Blood Tests (every 5 years) Total cholesterol, HDL, Triglycerides and ECG Order blood work  as a panel if possible and  for adults with routine risk  an electrocardiogram (ECG) at intervals determined by the provider. Done 9/2018 Colorectal Cancer Screening 
-Fecal occult blood test (annual) -Flexible sigmoidoscopy (5y) 
-Screening colonoscopy (10y) -Barium Enema Colorectal cancer screening should be done for adults age 54-65 with no increased risk factors for colorectal cancer. There are a number of acceptable methods of screening for this type of cancer. Each test has its own benefits and drawbacks. Discuss with your provider what is most appropriate for you during your annual wellness visit. The different tests include: colonoscopy (considered the best screening method), a fecal occult blood test, a fecal DNA test, and sigmoidoscopy Not indicated, age 40 Counseling to Prevent Tobacco Use (up to 8 sessions per year) - Counseling greater than 3 and up to 10 minutes - Counseling greater than 10 minutes Patients must be asymptomatic of tobacco-related conditions to receive as preventive service Not indicated Diabetes Screening Tests (at least every 3 years, Medicare covers annually or at 6-month intervals for prediabetic patients) Fasting blood sugar (FBS) or glucose tolerance test (GTT) All adults age 38-68 who are overweight should have a diabetes screening test once every three years.  
-Other screening tests & preventive services for persons with diabetes include: an eye exam to screen for diabetic retinopathy, a kidney function test, a foot exam, and stricter control over your cholesterol. Last September 2018 Diabetes Self-Management Training (DSMT) (no USPSTF recommendation) Requires referral by treating physician for patient with diabetes or renal disease. 10 hours of initial DSMT session of no less than 30 minutes each in a continuous 12-month period. 2 hours of follow-up DSMT in subsequent years. n/a Glaucoma Screening (no USPSTF recommendation) Diabetes mellitus, family history, , age 48 or over,  American, age 72 or over Patient partially blind, eye doctor unable to perform Human Immunodeficiency Virus (HIV) Screening (annually for increased risk patients) HIV-1 and HIV-2 by EIA, ROVERTO, rapid antibody test, or oral mucosa transudate Patient must be at increased risk for HIV infection per USPSTF guidelines or pregnant. Tests covered annually for patients at increased risk. Pregnant patients may receive up to 3 test during pregnancy. Not indicated Medical Nutrition Therapy (MNT) (for diabetes or renal disease not recommended schedule) Requires referral by treating physician for patient with diabetes or renal disease. Can be provided in same year as diabetes self-management training (DSMT), and CMS recommends medical nutrition therapy take place after DSMT. Up to 3 hours for initial year and 2 hours in subsequent years. Not indicated Prostate Cancer Screening (annually up to age 76) - Digital rectal exam (LAZARO) - Prostate specific antigen (PSA) Annually (age 48 or over), LAZARO not paid separately when covered E/M service is provided on same date Men up to age 76 may need a screening blood test for prostate cancer at certain intervals, depending on their personal and family history. This decision is between the patient and his provider. Not indicated, age 40 Seasonal Influenza Vaccination (annually) All adults should have a flu vaccine yearly  Ordered today Pneumococcal Vaccination (once after 72) All adults  over age 72 should receive the recommended pneumonia vaccines. Current USPSTF guidelines recommend a series of two vaccines for the best pneumonia protection. Not indicated Hepatitis B Vaccinations (if medium/high risk) Medium/high risk factors:  End-stage renal disease, Hemophiliacs who received Factor VIII or IX concentrates, Clients of institutions for the mentally retarded, Persons who live in the same house as a HepB virus carrier, Homosexual men, Illicit injectable drug abusers. Not indicated Shingles Vaccination A shingles vaccine is also recommended once in a lifetime after age 61 Not indicated Ultrasound Screening for Abdominal Aortic Aneurysm (AAA) (once) An Abdominal Aortic Aneurysm (AAA) Screening is recommended for men age 73-68 who has ever smoked in their lifetime. of the following criteria: 
- Men who are 73-68 years old and have smoked more than 100 cigarettes in their lifetime. 
-Anyone with a FH of AAA 
-Anyone recommended for screening by USPSTF Not indicated Hep C All adults born between 80 and 1965 should be screened once for Hepatitis C Not indicated Tetanus  All adults should have a tetanus vaccine every 10 years Declined

## 2019-11-21 NOTE — PROGRESS NOTES
Patient due for medicare wellness visit today- also sick visit for cough (see separate note)      This is the Subsequent Medicare Annual Wellness Exam, performed 12 months or more after the Initial AWV or the last Subsequent AWV    I have reviewed the patient's medical history in detail and updated the computerized patient record. 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 use thin layer to small areas 45 g 0    albuterol (PROVENTIL HFA, VENTOLIN HFA, PROAIR HFA) 90 mcg/actuation inhaler Take 1-2 Puffs by inhalation every four (4) hours as needed (cough). 1 Inhaler 0    inhalational spacing device (E-Z SPACER) Use with Albuterol inhaler 1 Device 0    montelukast (SINGULAIR) 10 mg tablet Take 1 Tab by mouth daily. 30 Tab 9    raNITIdine (ZANTAC) 300 mg tab Take 1 Tab by mouth daily. 30 Tab 11    fluticasone (FLONASE) 50 mcg/actuation nasal spray 2 Sprays by Both Nostrils route daily as needed for Rhinitis or Allergies. 1 Bottle 11    cetirizine (ZYRTEC) 10 mg tablet Take 1 Tab by mouth daily.  27 Tab 11     No Known Allergies    Family History   Problem Relation Age of Onset    Schizophrenia Mother     No Known Problems Father     No Known Problems Sister     No Known Problems Sister      Social History     Tobacco Use    Smoking status: Never Smoker    Smokeless tobacco: Never Used   Substance Use Topics    Alcohol use: No       Depression Risk Factor Screening:     3 most recent PHQ Screens 11/21/2019   PHQ Not Done (No Data)   Little interest or pleasure in doing things Not at all     Patient's mother answered and stated he is not depressed, he is his normal happy self. Alcohol Risk Factor Screening (MALE < 65): Do you average more than 2 drinks per night or 14 drinks a week: No    On any one occasion in the past three months have you have had more than 4 drinks containing alcohol:  No      Functional Ability and Level of Safety:   Hearing: Hearing is good. VISION- unable to perform today. Mother reports 'blind in one eye, cannot see out of the other' Patient also not speaking to providers today, down syndrome. Activities of Daily Living: The home contains: no safety equipment. Patient needs help with:  preparing meals Mother reports he is independent except for when preparing meals and using the stove. Ambulation: with no difficulty    Fall Risk:  Fall Risk Assessment, last 12 mths 11/21/2019   Able to walk? Yes   Fall in past 12 months? No       Abuse Screen:  Patient is not abused    Cognitive Screening   Has your family/caregiver stated any concerns about your memory: no Mother states she is not concerned about his memory. Cognitive Screening: Abnormal - communication difficulties, not communicating with provider at this visit, down syndrome. Mother not concerned about his cognition, this is his normal.     Patient Care Team   Patient Care Team:  Marilu Marquez NP as PCP - General (Nurse Practitioner)  Boni Montgomery NP as PCP - [facility]    Assessment/Plan   Education and counseling provided:  Are appropriate based on today's review and evaluation  Influenza Vaccine  Screening for glaucoma  Diabetes screening test    Diagnoses and all orders for this visit:    Medicare annual wellness visit, subsequent  Medicare wellness visit completed today.      HM Due:  Health Maintenance Due   Topic Date Due    Influenza Age 5 to Adult  08/01/2019     Influenza vaccine given today    Also due for lab work (last completed 9/2018). Encouraged to return for fasting labwork and follow up with office visit for review and routine chronic disease care. Will order routine fasting labs, and include A1c for pre-diabetes (CBC, CMP, TSH, Lipids, A1c)  Encounter for immunization  -     INFLUENZA VIRUS VAC QUAD,SPLIT,PRESV FREE SYRINGE IM  -     [place] IMMUNIZ ADMIN,1 SINGLE/COMB VAC/TOXOID    Cough  See separate note.    -     guaiFENesin-codeine (ROBITUSSIN AC) 100-10 mg/5 mL solution; Take 5 mL by mouth three (3) times daily as needed for Cough for up to 7 days. Max Daily Amount: 15 mL.

## 2019-11-21 NOTE — PROGRESS NOTES
PROBLEM/SICK OFFICE NOTE (SOAP)    11/21/2019  6:03 PM    SUBJECTIVE:    Chief Complaint   Patient presents with    Cough     x 1-2 weeks    Cold Symptoms     x 1-2 weeks    Immunization/Injection     flu shot       HPI:  Rosi Watts is a 40 y.o. male presenting today for office visit, establishing care with me today. Due for medicare wellness (see separate visit) and also presents with acute cough. Cough started about 2 weeks ago with change in season (hot to cold). No fever noted. Mother states he has not complained of headache, ear ache, body ache, chills, congestion, shortness of breath or wheezing. Review of Systems:  Review of Systems   Constitutional: Negative for activity change, chills, fatigue and fever. HENT: Negative for congestion, ear pain, sore throat and trouble swallowing. Respiratory: Positive for cough. Negative for shortness of breath and wheezing. Cardiovascular: Negative for chest pain and leg swelling. Musculoskeletal: Negative for arthralgias and myalgias. Skin: Negative for rash. Neurological: Negative for headaches. Psychiatric/Behavioral: Negative for behavioral problems.      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       Social History     Socioeconomic History    Marital status: UNKNOWN     Spouse name: Not on file    Number of children: Not on file    Years of education: Not on file    Highest education level: Not on file   Occupational History    Not on file   Social Needs    Financial resource strain: Not on file    Food insecurity:     Worry: Not on file     Inability: Not on file   ParkingCarma needs: Medical: Not on file     Non-medical: Not on file   Tobacco Use    Smoking status: Never Smoker    Smokeless tobacco: Never Used   Substance and Sexual Activity    Alcohol use: No    Drug use: No    Sexual activity: Never   Lifestyle    Physical activity:     Days per week: Not on file     Minutes per session: Not on file    Stress: Not on file   Relationships    Social connections:     Talks on phone: Not on file     Gets together: Not on file     Attends Sabianism service: Not on file     Active member of club or organization: Not on file     Attends meetings of clubs or organizations: Not on file     Relationship status: Not on file    Intimate partner violence:     Fear of current or ex partner: Not on file     Emotionally abused: Not on file     Physically abused: Not on file     Forced sexual activity: Not on file   Other Topics Concern    Not on file   Social History Narrative    Not on file       Not on File     Current Outpatient Medications   Medication Sig Dispense Refill    guaiFENesin-codeine (ROBITUSSIN AC) 100-10 mg/5 mL solution Take 5 mL by mouth three (3) times daily as needed for Cough for up to 7 days. Max Daily Amount: 15 mL. 105 mL 0    triamcinolone acetonide (KENALOG) 0.1 % topical cream Apply  to affected area two (2) times daily as needed for Skin Irritation or Itching. use thin layer to small areas 45 g 0    albuterol (PROVENTIL HFA, VENTOLIN HFA, PROAIR HFA) 90 mcg/actuation inhaler Take 1-2 Puffs by inhalation every four (4) hours as needed (cough). 1 Inhaler 0    inhalational spacing device (E-Z SPACER) Use with Albuterol inhaler 1 Device 0    montelukast (SINGULAIR) 10 mg tablet Take 1 Tab by mouth daily. 30 Tab 9    raNITIdine (ZANTAC) 300 mg tab Take 1 Tab by mouth daily. 30 Tab 11    fluticasone (FLONASE) 50 mcg/actuation nasal spray 2 Sprays by Both Nostrils route daily as needed for Rhinitis or Allergies.  1 Bottle 11    cetirizine (ZYRTEC) 10 mg tablet Take 1 Tab by mouth daily. 30 Tab 11           Patient Care Team:  Patient Care Team:  Louie Harris NP as PCP - General (Nurse Practitioner)  Shawn García NP as PCP - [facility]        OBJECTIVE:    Vitals:    11/21/19 1544   BP: 118/70   Pulse: 69   Resp: 12   Temp: 95.6 °F (35.3 °C)   TempSrc: Oral   SpO2: 97%   Weight: 178 lb 6.4 oz (80.9 kg)   Height: 5' 1\" (1.549 m)   PainSc:   0 - No pain       Physical Exam  Constitutional:       Appearance: He is well-developed. HENT:      Head: Atraumatic. Right Ear: Tympanic membrane and ear canal normal.      Left Ear: Tympanic membrane and ear canal normal.      Nose: Nose normal.      Mouth/Throat:      Mouth: Mucous membranes are moist.      Pharynx: Oropharynx is clear. No oropharyngeal exudate or posterior oropharyngeal erythema. Neck:      Musculoskeletal: Neck supple. Thyroid: No thyromegaly. Cardiovascular:      Rate and Rhythm: Normal rate and regular rhythm. Heart sounds: No murmur. Pulmonary:      Effort: Pulmonary effort is normal. No respiratory distress. Breath sounds: Normal breath sounds. No wheezing. Musculoskeletal: Normal range of motion. Skin:     General: Skin is warm and dry. Neurological:      Mental Status: He is alert and oriented to person, place, and time. Assessment & Plan:    Medicare annual wellness visit, subsequent  See separate note. Encounter for immunization  Flu shot given today. - INFLUENZA VIRUS VAC QUAD,SPLIT,PRESV FREE SYRINGE IM  - [place] IMMUNIZ ADMIN,1 SINGLE/COMB VAC/TOXOID    Cough  No signs of acute infection. Educated on symptom relief. Will prescribe cough syrup that mother states worked well in the past.     - guaiFENesin-codeine (ROBITUSSIN AC) 100-10 mg/5 mL solution; Take 5 mL by mouth three (3) times daily as needed for Cough for up to 7 days. Max Daily Amount: 15 mL.   Dispense: 105 mL; Refill: 0    Orders Placed This Encounter    MA IMMUNIZ ADMIN,1 SINGLE/COMB VAC/TOXOID    INFLUENZA VIRUS VACCINE QUADRIVALENT, PRESERVATIVE FREE SYRINGE (03072)    guaiFENesin-codeine (ROBITUSSIN AC) 100-10 mg/5 mL solution     Sig: Take 5 mL by mouth three (3) times daily as needed for Cough for up to 7 days. Max Daily Amount: 15 mL. Dispense:  105 mL     Refill:  0         Follow-up and Dispositions    · Return in about 3 months (around 2/21/2020), or if symptoms worsen or fail to improve, for Return for lab visit, then follow with routine care visit- 30 min 1 week later. MWV in 1 year. .         Plan of care reviewed with patient. Patient in agreement with plan and expresses understanding. I have discussed when to anticipate results and how results will be communicated, if applicable. Anticipatory guidance given and questions answered, patient encouraged to call or RTO if further questions or concerns.     Alphonso Lloyd NP  11/21/19

## 2019-11-21 NOTE — PROGRESS NOTES
Rosi Watts presents today for   Chief Complaint   Patient presents with    Cough     x 1-2 weeks    Cold Symptoms     x 1-2 weeks    Immunization/Injection     flu shot       Is someone accompanying this pt? Yes-mom    Is the patient using any DME equipment during OV? no    Depression Screening:  3 most recent PHQ Screens 9/7/2018   PHQ Not Done Medical Reason (indicate in comments)       Learning Assessment:  No flowsheet data found. Abuse Screening:  No flowsheet data found. Fall Risk  No flowsheet data found. Health Maintenance reviewed and discussed and ordered per Provider. Health Maintenance Due   Topic Date Due    Influenza Age 5 to Adult  08/01/2019   . Coordination of Care:  1. Have you been to the ER, urgent care clinic since your last visit? Hospitalized since your last visit? no    2. Have you seen or consulted any other health care providers outside of the [address] [place] [address] since your last visit? Include any pap smears or colon screening.  no

## 2019-12-26 ENCOUNTER — OFFICE VISIT (OUTPATIENT)
Dept: FAMILY MEDICINE CLINIC | Age: 44
End: 2019-12-26

## 2019-12-26 VITALS
DIASTOLIC BLOOD PRESSURE: 67 MMHG | OXYGEN SATURATION: 96 % | BODY MASS INDEX: 33.57 KG/M2 | HEIGHT: 61 IN | WEIGHT: 177.8 LBS | HEART RATE: 65 BPM | RESPIRATION RATE: 16 BRPM | TEMPERATURE: 96.3 F | SYSTOLIC BLOOD PRESSURE: 115 MMHG

## 2019-12-26 DIAGNOSIS — J06.9 VIRAL URI WITH COUGH: ICD-10-CM

## 2019-12-26 DIAGNOSIS — R05.9 COUGH: Primary | ICD-10-CM

## 2019-12-26 NOTE — PROGRESS NOTES
PROBLEM/SICK OFFICE NOTE (SOAP)    12/26/2019  3:55 PM    SUBJECTIVE:    Chief Complaint   Patient presents with    Cough     x 1 week    Cold Symptoms     x 1 week       HPI:  Myriam Metcalf is a 40 y.o. male presenting today for office visit. Accompanied by mother. Noticed symptoms about 1-2 weeks ago. Cough is described as productive, white cold coming up with it. Congestion noted too, mostly worse at night. Nasal congestion is white. No fevers, no body aches, no headaches, no sore throat. No appetite change, no fatigue, no ear pain. They have not tried to use an OTC treatments or medications. He goes to day support and could have possibly had sick contact there. Mother also recently ill, but is feeling better. Review of Systems:  Review of Systems   Constitutional: Negative for chills, fatigue and fever. HENT: Positive for congestion. Negative for ear discharge, ear pain, sinus pressure, sinus pain, sneezing, sore throat and trouble swallowing. Respiratory: Positive for cough. Negative for shortness of breath and wheezing. Cardiovascular: Negative for chest pain. Gastrointestinal: Negative for abdominal pain, constipation, diarrhea, nausea and vomiting. Musculoskeletal: Negative for arthralgias and myalgias. Skin: Negative for rash. Neurological: Negative for dizziness, weakness and headaches.          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       Social History     Socioeconomic History    Marital status: UNKNOWN     Spouse name: Not on file    Number of children: Not on file    Years of education: Not on file    Highest education level: Not on file   Occupational History    Not on file   Social Needs    Financial resource strain: Not on file    Food insecurity:     Worry: Not on file     Inability: Not on file    Transportation needs:     Medical: Not on file     Non-medical: Not on file   Tobacco Use    Smoking status: Never Smoker    Smokeless tobacco: Never Used   Substance and Sexual Activity    Alcohol use: No    Drug use: No    Sexual activity: Never   Lifestyle    Physical activity:     Days per week: Not on file     Minutes per session: Not on file    Stress: Not on file   Relationships    Social connections:     Talks on phone: Not on file     Gets together: Not on file     Attends [employer] service: Not on file     Active member of club or organization: Not on file     Attends meetings of clubs or organizations: Not on file     Relationship status: Not on file    Intimate partner violence:     Fear of current or ex partner: Not on file     Emotionally abused: Not on file     Physically abused: Not on file     Forced sexual activity: Not on file   Other Topics Concern    Not on file   Social History Narrative    Not on file       Not on File    Current Outpatient Medications   Medication Sig Dispense Refill    triamcinolone acetonide (KENALOG) 0.1 % topical cream Apply  to affected area two (2) times daily as needed for Skin Irritation or Itching. use thin layer to small areas 45 g 0    albuterol (PROVENTIL HFA, VENTOLIN HFA, PROAIR HFA) 90 mcg/actuation inhaler Take 1-2 Puffs by inhalation every four (4) hours as needed (cough). 1 Inhaler 0    inhalational spacing device (E-Z SPACER) Use with Albuterol inhaler 1 Device 0    montelukast (SINGULAIR) 10 mg tablet Take 1 Tab by mouth daily. 30 Tab 9    raNITIdine (ZANTAC) 300 mg tab Take 1 Tab by mouth daily. 30 Tab 11    fluticasone (FLONASE) 50 mcg/actuation nasal spray 2 Sprays by Both Nostrils route daily as needed for Rhinitis or Allergies.  1 Bottle 11    cetirizine (ZYRTEC) 10 mg tablet Take 1 Tab by mouth daily. 30 Tab 11           Patient Care Team:  Patient Care Team:  Quin Pringle NP as PCP - General (Nurse Practitioner)        OBJECTIVE:    Vitals:    12/26/19 1548   BP: 115/67   Pulse: 65   Resp: 16   Temp: 96.3 °F (35.7 °C)   TempSrc: Oral   SpO2: 96%   Weight: 177 lb 12.8 oz (80.6 kg)   Height: 5' 1\" (1.549 m)   PainSc:   0 - No pain       Physical Exam  Vitals signs and nursing note reviewed. Constitutional:       Appearance: Normal appearance. He is obese. HENT:      Head: Atraumatic. Right Ear: Tympanic membrane normal.      Left Ear: Tympanic membrane, ear canal and external ear normal.      Ears:      Comments: Erythematous right ear canal     Nose: Rhinorrhea present. Mouth/Throat:      Mouth: Mucous membranes are moist.      Pharynx: Oropharynx is clear. No pharyngeal swelling, oropharyngeal exudate or posterior oropharyngeal erythema. Cardiovascular:      Rate and Rhythm: Normal rate and regular rhythm. Heart sounds: No murmur. Pulmonary:      Effort: Pulmonary effort is normal.      Breath sounds: Normal breath sounds. No wheezing or rhonchi. Musculoskeletal: Normal range of motion. Neurological:      Mental Status: He is alert. Mental status is at baseline. Psychiatric:         Behavior: Behavior normal.         Assessment & Plan:    Viral URI with cough  Will treat symptomatically, no signs of acute bacterial infection. Cough syrup with mucinex for cough and congestion. Information provided on supportive care. - dextromethorphan-guaiFENesin (ROBITUSSIN COUGH-CHEST MIKEL DM) 5-100 mg/5 mL liqd; Take 10 mL by mouth every four to six (4-6) hours as needed for Cough. Dispense: 1 Bottle; Refill: 0        Orders Placed This Encounter    dextromethorphan-guaiFENesin (ROBITUSSIN COUGH-CHEST MIKEL DM) 5-100 mg/5 mL liqd     Sig: Take 10 mL by mouth every four to six (4-6) hours as needed for Cough.      Dispense:  1 Bottle     Refill:  0            Follow-up and Dispositions    · Return if symptoms worsen or fail to improve. Plan of care reviewed with patient. Patient in agreement with plan and expresses understanding. I have discussed when to anticipate results and how results will be communicated, if applicable. Anticipatory guidance given and questions answered, patient encouraged to call or RTO if further questions or concerns.     Thedford Kehr, NP  12/26/19

## 2019-12-26 NOTE — PATIENT INSTRUCTIONS
Cough: Care Instructions Your Care Instructions A cough is your body's response to something that bothers your throat or airways. Many things can cause a cough. You might cough because of a cold or the flu, bronchitis, or asthma. Smoking, postnasal drip, allergies, and stomach acid that backs up into your throat also can cause coughs. A cough is a symptom, not a disease. Most coughs stop when the cause, such as a cold, goes away. You can take a few steps at home to cough less and feel better. Follow-up care is a key part of your treatment and safety. Be sure to make and go to all appointments, and call your doctor if you are having problems. It's also a good idea to know your test results and keep a list of the medicines you take. How can you care for yourself at home? · Drink lots of water and other fluids. This helps thin the mucus and soothes a dry or sore throat. Honey or lemon juice in hot water or tea may ease a dry cough. · Take cough medicine as directed by your doctor. · Prop up your head on pillows to help you breathe and ease a dry cough. · Try cough drops to soothe a dry or sore throat. Cough drops don't stop a cough. Medicine-flavored cough drops are no better than candy-flavored drops or hard candy. · Do not smoke. Avoid secondhand smoke. If you need help quitting, talk to your doctor about stop-smoking programs and medicines. These can increase your chances of quitting for good. When should you call for help? Call 911 anytime you think you may need emergency care.  For example, call if: 
  · You have severe trouble breathing.  
 Call your doctor now or seek immediate medical care if: 
  · You cough up blood.  
  · You have new or worse trouble breathing.  
  · You have a new or higher fever.  
  · You have a new rash.  
 Watch closely for changes in your health, and be sure to contact your doctor if: 
  · You cough more deeply or more often, especially if you notice more mucus or a change in the color of your mucus.  
  · You have new symptoms, such as a sore throat, an earache, or sinus pain.  
  · You do not get better as expected. Where can you learn more? Go to http://sheldon-shelley.info/. Enter D279 in the search box to learn more about \"Cough: Care Instructions. \" Current as of: June 9, 2019 Content Version: 12.2 © 3783-8670 [employer]. Care instructions adapted under license by [employer] (which disclaims liability or warranty for this information). If you have questions about a medical condition or this instruction, always ask your healthcare professional. [place] [address] any warranty or liability for your use of this information. Upper Respiratory Infection (Cold): Care Instructions Your Care Instructions An upper respiratory infection, or URI, is an infection of the nose, sinuses, or throat. URIs are spread by coughs, sneezes, and direct contact. The common cold is the most frequent kind of URI. The flu and sinus infections are other kinds of URIs. Almost all URIs are caused by viruses. Antibiotics won't cure them. But you can treat most infections with home care. This may include drinking lots of fluids and taking over-the-counter pain medicine. You will probably feel better in 4 to 10 days. The doctor has checked you carefully, but problems can develop later. If you notice any problems or new symptoms, get medical treatment right away. Follow-up care is a key part of your treatment and safety. Be sure to make and go to all appointments, and call your doctor if you are having problems. It's also a good idea to know your test results and keep a list of the medicines you take. How can you care for yourself at home? · To prevent dehydration, drink plenty of fluids, enough so that your urine is light yellow or clear like water.  Choose water and other caffeine-free clear liquids until you feel better. If you have kidney, heart, or liver disease and have to limit fluids, talk with your doctor before you increase the amount of fluids you drink. · Take an over-the-counter pain medicine, such as acetaminophen (Tylenol), ibuprofen (Advil, Motrin), or naproxen (Aleve). Read and follow all instructions on the label. · Before you use cough and cold medicines, check the label. These medicines may not be safe for young children or for people with certain health problems. · Be careful when taking over-the-counter cold or flu medicines and Tylenol at the same time. Many of these medicines have acetaminophen, which is Tylenol. Read the labels to make sure that you are not taking more than the recommended dose. Too much acetaminophen (Tylenol) can be harmful. · Get plenty of rest. 
· Do not smoke or allow others to smoke around you. If you need help quitting, talk to your doctor about stop-smoking programs and medicines. These can increase your chances of quitting for good. When should you call for help? Call 911 anytime you think you may need emergency care. For example, call if: 
  · You have severe trouble breathing.  
 Call your doctor now or seek immediate medical care if: 
  · You seem to be getting much sicker.  
  · You have new or worse trouble breathing.  
  · You have a new or higher fever.  
  · You have a new rash.  
 Watch closely for changes in your health, and be sure to contact your doctor if: 
  · You have a new symptom, such as a sore throat, an earache, or sinus pain.  
  · You cough more deeply or more often, especially if you notice more mucus or a change in the color of your mucus.  
  · You do not get better as expected. Where can you learn more? Go to http://sheldon-shelley.info/. Enter K528 in the search box to learn more about \"Upper Respiratory Infection (Cold): Care Instructions. \" Current as of: June 9, 2019 Content Version: 12.2 © 9443-8894 [employer]. Care instructions adapted under license by [employer] (which disclaims liability or warranty for this information). If you have questions about a medical condition or this instruction, always ask your healthcare professional. [address] any warranty or liability for your use of this information.

## 2019-12-26 NOTE — PROGRESS NOTES
Favio Saunders presents today for   Chief Complaint   Patient presents with    Cough     x 1 week    Cold Symptoms     x 1 week       Is someone accompanying this pt? yes    Is the patient using any DME equipment during OV? no    Depression Screening:  3 most recent PHQ Screens 11/21/2019   PHQ Not Done (No Data)   Little interest or pleasure in doing things Not at all       Learning Assessment:  No flowsheet data found. Abuse Screening:  Abuse Screening Questionnaire 11/21/2019   Do you ever feel afraid of your partner? N   Are you in a relationship with someone who physically or mentally threatens you? N   Is it safe for you to go home? Y       Fall Risk  Fall Risk Assessment, last 12 mths 11/21/2019   Able to walk? Yes   Fall in past 12 months? No       Health Maintenance reviewed and discussed and ordered per Provider. There are no preventive care reminders to display for this patient. .      Coordination of Care:  1. Have you been to the ER, urgent care clinic since your last visit? Hospitalized since your last visit? no    2. Have you seen or consulted any other health care providers outside of the [address] [place] [address] since your last visit? Include any pap smears or colon screening.  no

## 2019-12-30 ENCOUNTER — TELEPHONE (OUTPATIENT)
Dept: FAMILY MEDICINE CLINIC | Age: 44
End: 2019-12-30

## 2019-12-30 DIAGNOSIS — J40 BRONCHITIS: ICD-10-CM

## 2019-12-30 DIAGNOSIS — R05.9 COUGH: Primary | ICD-10-CM

## 2019-12-30 NOTE — TELEPHONE ENCOUNTER
Patient's mother called stating her son is still coughing bad, to the point where he throws up. She wants to know what he can take.     [phone]

## 2019-12-30 NOTE — TELEPHONE ENCOUNTER
Spoke with patient mother at this time. Patient was seen last week for coughing. Patient mother states he hasn't had no relief or improvement at this time. Patient is coughing to the point he is vomiting. Patient mother is asking if a different medication could be sent in at this time. Patient mother states he use tessalon pearles in the past and that did not help. No fever noted just coughing. Please Advise

## 2019-12-31 ENCOUNTER — TELEPHONE (OUTPATIENT)
Dept: FAMILY MEDICINE CLINIC | Age: 44
End: 2019-12-31

## 2019-12-31 RX ORDER — AZITHROMYCIN 250 MG/1
TABLET, FILM COATED ORAL
Qty: 6 TAB | Refills: 0 | Status: SHIPPED | OUTPATIENT
Start: 2019-12-31 | End: 2020-01-05

## 2019-12-31 NOTE — TELEPHONE ENCOUNTER
Spoke with Kassandra Núñez pharmacist regarding prescription being not stocked. The stocked version is Robitussin AC. Gave verbal order for robitussin AC 15 mL q4-6 hours prn for cough, dispense 120 mL bottle.

## 2019-12-31 NOTE — TELEPHONE ENCOUNTER
Attempted to reach patient at [phone], no answer. Called mother at [phone]. She has been giving Jeimy Lizy cough medicine (OTC robitussin DM) and its not working well. She reports he is still coughing a lot, even coughing so much he'll throw up. Cough is worse at night, not getting much sleep. Not getting worse but its not clearing up. Due to duration of symptoms for 2 weeks will treat with antibiotic. Also requesting a stronger cough syrup. Will do codetussin (guaifenesin with codeine)- explained this will make him tired, need to take carefully and only at night. Also educated on supportive symptoms and including use of albuterol he has.

## 2020-03-20 ENCOUNTER — HOSPITAL ENCOUNTER (OUTPATIENT)
Dept: LAB | Age: 45
Discharge: HOME | End: 2020-03-20
Payer: COMMERCIAL

## 2020-03-20 ENCOUNTER — TELEPHONE (OUTPATIENT)
Dept: FAMILY MEDICINE CLINIC | Age: 45
End: 2020-03-20

## 2020-03-20 ENCOUNTER — OFFICE VISIT (OUTPATIENT)
Dept: FAMILY MEDICINE CLINIC | Age: 45
End: 2020-03-20

## 2020-03-20 VITALS
BODY MASS INDEX: 33.23 KG/M2 | DIASTOLIC BLOOD PRESSURE: 67 MMHG | OXYGEN SATURATION: 95 % | RESPIRATION RATE: 20 BRPM | TEMPERATURE: 95.9 F | SYSTOLIC BLOOD PRESSURE: 112 MMHG | HEART RATE: 74 BPM | HEIGHT: 61 IN | WEIGHT: 176 LBS

## 2020-03-20 DIAGNOSIS — J31.0 CHRONIC RHINITIS: ICD-10-CM

## 2020-03-20 DIAGNOSIS — Z01.89 ENCOUNTER FOR LABORATORY EXAMINATION: ICD-10-CM

## 2020-03-20 DIAGNOSIS — Z13.228 SCREENING FOR ENDOCRINE, METABOLIC AND IMMUNITY DISORDER: ICD-10-CM

## 2020-03-20 DIAGNOSIS — Z13.220 ENCOUNTER FOR LIPID SCREENING FOR CARDIOVASCULAR DISEASE: ICD-10-CM

## 2020-03-20 DIAGNOSIS — R05.9 COUGH: ICD-10-CM

## 2020-03-20 DIAGNOSIS — Z13.6 ENCOUNTER FOR LIPID SCREENING FOR CARDIOVASCULAR DISEASE: ICD-10-CM

## 2020-03-20 DIAGNOSIS — J30.1 ALLERGIC RHINITIS DUE TO POLLEN, UNSPECIFIED SEASONALITY: ICD-10-CM

## 2020-03-20 DIAGNOSIS — Z13.29 SCREENING FOR ENDOCRINE, METABOLIC AND IMMUNITY DISORDER: ICD-10-CM

## 2020-03-20 DIAGNOSIS — R79.89 LOW TSH LEVEL: ICD-10-CM

## 2020-03-20 DIAGNOSIS — B35.3 TINEA PEDIS, UNSPECIFIED LATERALITY: Primary | ICD-10-CM

## 2020-03-20 DIAGNOSIS — Z13.0 SCREENING FOR ENDOCRINE, METABOLIC AND IMMUNITY DISORDER: ICD-10-CM

## 2020-03-20 DIAGNOSIS — R73.03 PREDIABETES: ICD-10-CM

## 2020-03-20 DIAGNOSIS — R21 RASH AND NONSPECIFIC SKIN ERUPTION: ICD-10-CM

## 2020-03-20 LAB
ALBUMIN SERPL-MCNC: 3.6 G/DL (ref 3.4–5)
ALBUMIN/GLOB SERPL: 0.9 {RATIO} (ref 0.8–1.7)
ALP SERPL-CCNC: 68 U/L (ref 45–117)
ALT SERPL-CCNC: 24 U/L (ref 16–61)
ANION GAP SERPL CALC-SCNC: 5 MMOL/L (ref 3–18)
APPEARANCE UR: CLEAR
AST SERPL-CCNC: 17 U/L (ref 10–38)
BILIRUB SERPL-MCNC: 0.3 MG/DL (ref 0.2–1)
BILIRUB UR QL: NEGATIVE
BUN SERPL-MCNC: 23 MG/DL (ref 7–18)
BUN/CREAT SERPL: 17 (ref 12–20)
CALCIUM SERPL-MCNC: 8.2 MG/DL (ref 8.5–10.1)
CHLORIDE SERPL-SCNC: 109 MMOL/L (ref 100–111)
CHOLEST SERPL-MCNC: 233 MG/DL
CO2 SERPL-SCNC: 27 MMOL/L (ref 21–32)
COLOR UR: YELLOW
CREAT SERPL-MCNC: 1.33 MG/DL (ref 0.6–1.3)
ERYTHROCYTE [DISTWIDTH] IN BLOOD BY AUTOMATED COUNT: 15.7 % (ref 11.6–14.5)
EST. AVERAGE GLUCOSE BLD GHB EST-MCNC: 117 MG/DL
GLOBULIN SER CALC-MCNC: 3.9 G/DL (ref 2–4)
GLUCOSE SERPL-MCNC: 101 MG/DL (ref 74–99)
GLUCOSE UR STRIP.AUTO-MCNC: NEGATIVE MG/DL
HBA1C MFR BLD: 5.7 % (ref 4.2–5.6)
HCT VFR BLD AUTO: 45 % (ref 36–48)
HDLC SERPL-MCNC: 67 MG/DL (ref 40–60)
HDLC SERPL: 3.5 {RATIO} (ref 0–5)
HGB BLD-MCNC: 15.2 G/DL (ref 13–16)
HGB UR QL STRIP: NEGATIVE
KETONES UR QL STRIP.AUTO: NEGATIVE MG/DL
LDLC SERPL CALC-MCNC: 153.8 MG/DL (ref 0–100)
LEUKOCYTE ESTERASE UR QL STRIP.AUTO: NEGATIVE
LIPID PROFILE,FLP: ABNORMAL
MCH RBC QN AUTO: 31.9 PG (ref 24–34)
MCHC RBC AUTO-ENTMCNC: 33.8 G/DL (ref 31–37)
MCV RBC AUTO: 94.3 FL (ref 74–97)
NITRITE UR QL STRIP.AUTO: NEGATIVE
PH UR STRIP: 5 [PH] (ref 5–8)
PLATELET # BLD AUTO: 209 K/UL (ref 135–420)
PMV BLD AUTO: 10.6 FL (ref 9.2–11.8)
POTASSIUM SERPL-SCNC: 4.1 MMOL/L (ref 3.5–5.5)
PROT SERPL-MCNC: 7.5 G/DL (ref 6.4–8.2)
PROT UR STRIP-MCNC: NEGATIVE MG/DL
RBC # BLD AUTO: 4.77 M/UL (ref 4.7–5.5)
SODIUM SERPL-SCNC: 141 MMOL/L (ref 136–145)
SP GR UR REFRACTOMETRY: >1.03 (ref 1–1.03)
TRIGL SERPL-MCNC: 61 MG/DL (ref ?–150)
TSH SERPL DL<=0.05 MIU/L-ACNC: 2.47 UIU/ML (ref 0.36–3.74)
UROBILINOGEN UR QL STRIP.AUTO: 1 EU/DL (ref 0.2–1)
VLDLC SERPL CALC-MCNC: 12.2 MG/DL
WBC # BLD AUTO: 4.2 K/UL (ref 4.6–13.2)

## 2020-03-20 PROCEDURE — 83036 HEMOGLOBIN GLYCOSYLATED A1C: CPT

## 2020-03-20 PROCEDURE — 36415 COLL VENOUS BLD VENIPUNCTURE: CPT

## 2020-03-20 PROCEDURE — 80053 COMPREHEN METABOLIC PANEL: CPT

## 2020-03-20 PROCEDURE — 84443 ASSAY THYROID STIM HORMONE: CPT

## 2020-03-20 PROCEDURE — 80061 LIPID PANEL: CPT

## 2020-03-20 PROCEDURE — 81003 URINALYSIS AUTO W/O SCOPE: CPT

## 2020-03-20 PROCEDURE — 85027 COMPLETE CBC AUTOMATED: CPT

## 2020-03-20 RX ORDER — CETIRIZINE HCL 10 MG
10 TABLET ORAL DAILY
Qty: 30 TAB | Refills: 11 | Status: SHIPPED | OUTPATIENT
Start: 2020-03-20 | End: 2021-02-05 | Stop reason: SDUPTHER

## 2020-03-20 RX ORDER — DEXTROMETHORPHAN HYDROBROMIDE, GUAIFENESIN 20; 400 MG/20ML; MG/20ML
10 SOLUTION ORAL
Qty: 1 BOTTLE | Refills: 0 | Status: SHIPPED | OUTPATIENT
Start: 2020-03-20 | End: 2020-07-24 | Stop reason: SDUPTHER

## 2020-03-20 RX ORDER — TRIAMCINOLONE ACETONIDE 1 MG/G
CREAM TOPICAL
Qty: 45 G | Refills: 0 | Status: SHIPPED | OUTPATIENT
Start: 2020-03-20 | End: 2021-03-16 | Stop reason: SDUPTHER

## 2020-03-20 RX ORDER — MONTELUKAST SODIUM 10 MG/1
10 TABLET ORAL DAILY
Qty: 30 TAB | Refills: 9 | Status: SHIPPED | OUTPATIENT
Start: 2020-03-20 | End: 2021-04-20 | Stop reason: SDUPTHER

## 2020-03-20 NOTE — TELEPHONE ENCOUNTER
Larry is calling to inform the provider they do not have the strength in the Robitussin that Dorrine Pain prescribed.  Please call Larry to clarify which strength is appropriate when available

## 2020-03-20 NOTE — PROGRESS NOTES
Carl Mcgee presents today for   Chief Complaint   Patient presents with    Cough     x 1 week worse at night       Is someone accompanying this pt? no    Is the patient using any DME equipment during [address]? no    Depression Screening:  3 most recent PHQ Screens 3/20/2020   PHQ Not Done -   Little interest or pleasure in doing things Not at all   Feeling down, depressed, irritable, or hopeless Not at all   Total Score PHQ 2 0       Learning Assessment:  No flowsheet data found. Abuse Screening:  Abuse Screening Questionnaire 11/21/2019   Do you ever feel afraid of your partner? N   Are you in a relationship with someone who physically or mentally threatens you? N   Is it safe for you to go home? Y       Fall Risk  Fall Risk Assessment, last 12 mths 11/21/2019   Able to walk? Yes   Fall in past 12 months? No       Health Maintenance reviewed and discussed and ordered per Provider. Health Maintenance Due   Topic Date Due    A1C test (Diabetic or Prediabetic)  09/12/2019   . Coordination of Care:  1. Have you been to the ER, urgent care clinic since your last visit? Hospitalized since your last visit? no    2. Have you seen or consulted any other health care providers outside of the [address] [place] [address] since your last visit? Include any pap smears or colon screening.  no

## 2020-03-20 NOTE — TELEPHONE ENCOUNTER
Confirmed with the pharmacy it is the OTC strength cough medicine.   Pharmacist verbalized understanding

## 2020-03-20 NOTE — PROGRESS NOTES
OFFICE NOTE (SOAP)      3/20/2020  8:07 AM    Chief Complaint   Patient presents with    Cough     x 1 week worse at night       SUBJECTIVE:    HPI:   Yamilka Doll is a 39 y.o. male presenting today for office visit. Here today for routine care but with complaint of cough, worse at night. Problems include down syndrome & blindness, low TSH, prediabets. No routine medications. Labs ordered in November, has not had completed. Will do today as he is fasting. Cough- mother concerned for cough. Cough is unproductive, worse at night. No fever. No shortness of breath or wheezing. Some congestion. She states the cough medicine he took last worked well. She is requesting refill of this. Not currently taking his zyrtec or singular previously prescribed. Dry skin/rash- this is located behind left ear. Raised, red, dry skin. Patient is not really itching or bothering it but mother is concerned. Has used steroid cream previously which worked well. Dry and odorous feet- mother concerned he may be developing a foot fungus. States he used to see podiatrist and is requesting referral.      Review of Systems   Constitutional: Negative for activity change, appetite change, chills and fever. HENT: Positive for congestion. Negative for ear pain and sore throat. Respiratory: Positive for cough. Negative for shortness of breath and wheezing. Gastrointestinal: Negative for abdominal pain, constipation, diarrhea and vomiting. Musculoskeletal: Negative for arthralgias. Skin: Positive for color change and rash. Neurological: Negative for headaches. Psychiatric/Behavioral: Negative for agitation and dysphoric mood. The patient is not nervous/anxious.         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       Social History     Socioeconomic History    Marital status: UNKNOWN     Spouse name: Not on file    Number of children: Not on file    Years of education: Not on file    Highest education level: Not on file   Occupational History    Not on file   Social Needs    Financial resource strain: Not on file    Food insecurity     Worry: Not on file     Inability: Not on file    Transportation needs     Medical: Not on file     Non-medical: Not on file   Tobacco Use    Smoking status: Never Smoker    Smokeless tobacco: Never Used   Substance and Sexual Activity    Alcohol use: No    Drug use: No    Sexual activity: Never   Lifestyle    Physical activity     Days per week: Not on file     Minutes per session: Not on file    Stress: Not on file   Relationships    Social connections     Talks on phone: Not on file     Gets together: Not on file     Attends [employer] service: Not on file     Active member of club or organization: Not on file     Attends meetings of clubs or organizations: Not on file     Relationship status: Not on file    Intimate partner violence     Fear of current or ex partner: Not on file     Emotionally abused: Not on file     Physically abused: Not on file     Forced sexual activity: Not on file   Other Topics Concern    Not on file   Social History Narrative    Not on file       Family History   Problem Relation Age of Onset    Schizophrenia Mother     No Known Problems Father     No Known Problems Sister     No Known Problems Sister        Not on File    Current Outpatient Medications   Medication Sig Dispense Refill    albuterol (PROVENTIL HFA, VENTOLIN HFA, PROAIR HFA) 90 mcg/actuation inhaler Take 1-2 Puffs by inhalation every four (4) hours as needed (cough).  1 Inhaler 0    inhalational spacing device (E-Z SPACER) Use with Albuterol inhaler 1 Device 0    dextromethorphan-guaiFENesin (ROBITUSSIN COUGH-CHEST MIKEL DM) 5-100 mg/5 mL liqd Take 10 mL by mouth every four to six (4-6) hours as needed for Cough. 1 Bottle 0    triamcinolone acetonide (KENALOG) 0.1 % topical cream Apply  to affected area two (2) times daily as needed for Skin Irritation or Itching. use thin layer to small areas 45 g 0    montelukast (SINGULAIR) 10 mg tablet Take 1 Tab by mouth daily. 30 Tab 9    raNITIdine (ZANTAC) 300 mg tab Take 1 Tab by mouth daily. 30 Tab 11    fluticasone (FLONASE) 50 mcg/actuation nasal spray 2 Sprays by Both Nostrils route daily as needed for Rhinitis or Allergies. 1 Bottle 11    cetirizine (ZYRTEC) 10 mg tablet Take 1 Tab by mouth daily. 30 Tab 11       Patient Care Team:  Patient Care Team:  Yoana Landin NP as PCP - General (Nurse Practitioner)  Yoana Landin NP as PCP - [facility] Provider      LABS:  None new to review    RADIOLOGY:  None new to review      OBJECTIVE:      Physical Exam  Vitals signs and nursing note reviewed. Constitutional:       Appearance: Normal appearance. He is not ill-appearing. HENT:      Right Ear: Tympanic membrane, ear canal and external ear normal.      Left Ear: Tympanic membrane, ear canal and external ear normal.      Nose: Nose normal.      Mouth/Throat:      Mouth: Mucous membranes are moist.      Pharynx: No oropharyngeal exudate or posterior oropharyngeal erythema. Neck:      Musculoskeletal: No muscular tenderness. Cardiovascular:      Rate and Rhythm: Normal rate and regular rhythm. Pulses: Normal pulses. Dorsalis pedis pulses are 2+ on the right side and 2+ on the left side. Heart sounds: Normal heart sounds. No murmur. Pulmonary:      Effort: Pulmonary effort is normal.      Breath sounds: Normal breath sounds. No wheezing or rhonchi.       Comments: No cough observed  Musculoskeletal: Normal range of motion. Right foot: Normal range of motion. No deformity or bunion. Left foot: Normal range of motion. No deformity or bunion. Feet:    Feet:      Right foot:      Skin integrity: Skin integrity normal. No skin breakdown or erythema. Toenail Condition: Right toenails are abnormally thick. Fungal disease present. Left foot:      Skin integrity: Skin integrity normal. No skin breakdown or erythema. Toenail Condition: Left toenails are abnormally thick. Fungal disease present. Comments: Mother concerned for darkening of skin between toes, although could be normal variant    Toenails thickened and yellow discoloration    Feet are malodorous   Lymphadenopathy:      Cervical: No cervical adenopathy. Skin:     General: Skin is warm and dry. Findings: Rash present. Rash is macular and papular. Neurological:      Mental Status: He is alert. Mental status is at baseline. Psychiatric:         Behavior: Behavior normal.           Vitals:    03/20/20 0750   BP: 112/67   Pulse: 74   Resp: 20   Temp: 95.9 °F (35.5 °C)   TempSrc: Oral   SpO2: 95%   Weight: 176 lb (79.8 kg)   Height: 5' 1\" (1.549 m)   PainSc:   0 - No pain         Assessment & Plan:    Allergic rhinitis due to pollen, unspecified seasonality    - cetirizine (ZYRTEC) 10 mg tablet; Take 1 Tab by mouth daily. Dispense: 30 Tab; Refill: 11    Cough    - cetirizine (ZYRTEC) 10 mg tablet; Take 1 Tab by mouth daily. Dispense: 30 Tab; Refill: 11  - montelukast (SINGULAIR) 10 mg tablet; Take 1 Tab by mouth daily. Dispense: 30 Tab; Refill: 9  - dextromethorphan-guaiFENesin (Robitussin Cough-Chest Dequan DM) 5-100 mg/5 mL liqd; Take 10 mL by mouth every four to six (4-6) hours as needed for Cough. Dispense: 1 Bottle; Refill: 0    Chronic rhinitis    - montelukast (SINGULAIR) 10 mg tablet; Take 1 Tab by mouth daily. Dispense: 30 Tab;  Refill: 9    Rash and nonspecific skin eruption  To be applied to rash behind left ear    - triamcinolone acetonide (KENALOG) 0.1 % topical cream; Apply  to affected area two (2) times daily as needed for Skin Irritation or Itching. use thin layer to small areas  Dispense: 45 g; Refill: 0    Tinea pedis, unspecified laterality    - REFERRAL TO PODIATRY    Encounter for laboratory examination  - COLLECTION VENOUS BLOOD,VENIPUNCTURE    Low TSH level  Labs collected today  - COLLECTION VENOUS BLOOD,VENIPUNCTURE    Prediabetes  Labs collected today    - COLLECTION VENOUS BLOOD,VENIPUNCTURE        Orders Placed This Encounter    REFERRAL TO PODIATRY     Referral Priority:   Routine     Referral Type:   Consultation     Referral Reason:   Specialty Services Required     Requested Specialty:   Podiatry     Number of Visits Requested:   1    COLLECTION VENOUS BLOOD,VENIPUNCTURE    cetirizine (ZYRTEC) 10 mg tablet     Sig: Take 1 Tab by mouth daily. Dispense:  30 Tab     Refill:  11    montelukast (SINGULAIR) 10 mg tablet     Sig: Take 1 Tab by mouth daily. Dispense:  30 Tab     Refill:  9    triamcinolone acetonide (KENALOG) 0.1 % topical cream     Sig: Apply  to affected area two (2) times daily as needed for Skin Irritation or Itching. use thin layer to small areas     Dispense:  45 g     Refill:  0    dextromethorphan-guaiFENesin (Robitussin Cough-Chest Dequan DM) 5-100 mg/5 mL liqd     Sig: Take 10 mL by mouth every four to six (4-6) hours as needed for Cough. Dispense:  1 Bottle     Refill:  0     Follow-up and Dispositions    · Return in about 6 months (around 9/20/2020), or if symptoms worsen or fail to improve, for routine care- 15 min. Plan of care reviewed with patient. Patient in agreement with plan and expresses understanding. I have discussed when to anticipate results and how results will be communicated, if applicable.  Anticipatory guidance given and questions answered, patient encouraged to call or RTO if further questions or concerns.     Abbie Almanza NP  03/20/20

## 2020-03-20 NOTE — PROGRESS NOTES
Patient presents for lab draw ordered by Larene Kanner  Ordering Department/Practice:  [facility]  Date Ordered:  11-     The following labs were drawn and sent to Savana/Brenda     CBC, Lipid Profile, CMP, TSH, 3rd Generation and A1C    The following tubes were sent:    Gold  ( 1) and Lavender  ( 1)    Draw site:  LAC  Pain Level:0  Needle Gauge23  Aseptic technique used  Blood thinners:n  Band-Aid applied  Draw fee added   Procedure tolerated well, patient voiced no complaints.

## 2020-07-24 ENCOUNTER — VIRTUAL VISIT (OUTPATIENT)
Dept: FAMILY MEDICINE CLINIC | Age: 45
End: 2020-07-24

## 2020-07-24 DIAGNOSIS — R05.9 COUGH: Primary | ICD-10-CM

## 2020-07-24 RX ORDER — DEXTROMETHORPHAN HYDROBROMIDE, GUAIFENESIN 20; 400 MG/20ML; MG/20ML
10 SOLUTION ORAL
Qty: 1 BOTTLE | Refills: 0 | Status: SHIPPED | OUTPATIENT
Start: 2020-07-24 | End: 2020-09-21

## 2020-07-24 NOTE — Clinical Note
Can you call to schedule patient for chest xray? Just visit with ryanne is fine.  Mother states she is free on Tuesday

## 2020-07-24 NOTE — PROGRESS NOTES
Myriam Metcalf is a 39 y.o. male who was seen by synchronous (real-time) audio-video technology on 7/24/2020 for Cough        Assessment & Plan:   Diagnoses and all orders for this visit:    1. Cough  -     XR CHEST PA LAT; Future  -     dextromethorphan-guaiFENesin (Robitussin Cough-Chest Dequan DM) 5-100 mg/5 mL liqd; Take 10 mL by mouth every four to six (4-6) hours as needed for Cough. Follow-up and Dispositions    · Return for chest xray on tuesday. I spent at least 10 minutes on this visit with this established patient. 712  Subjective:     Cough- patient has been seen multiple times for cough. In fact every visit in the past year this has been a concern. 5/2019, 6/2019, 9/2019, 11/2019,12/2019, 3/2020. Mother states this cough is dry, unproductive. It tends to be intermittent- clears up but continues to return. Especially flares up when weather changes or getting caught in the rain. No fever, no runny nose, no ear pain, no sore throat, no shortness of breath or wheezing. It has been treated symptomatically of late and tends to respond well to OTC cough syrups. Review of record does show chest x-ray done 5/2019 as below. Mother not aware of results or need for follow up imaging- this was never done. Will have patient come in for repeat chest x-ray and if further imaging needed will ordr.     PA And Lateral Chest     CPT CODE: 10139     COMPARISON: None.     CLINICAL INFORMATION: Cough.     FINDINGS:     Heart size and mediastinal contours within normal limits. Dense nodule in the  medial right upper lung suggestive of a granuloma. Subtle nodular density in the  left base adjacent to the heart margin. No focal consolidation. No pulmonary  edema, effusion, or pneumothorax. No acute osseous abnormality. .     IMPRESSION  IMPRESSION:     1. Probable granuloma in the medial right upper lung. 2. Subtle nodular density at the left base adjacent to the heart margin  nonspecific.  This may represent artifact from overlapping structures. Not  clearly a calcified granuloma. Lung nodule not excluded. CT chest correlation  recommended for further evaluation. Prior to Admission medications    Medication Sig Start Date End Date Taking? Authorizing Provider   cetirizine (ZYRTEC) 10 mg tablet Take 1 Tab by mouth daily. 3/20/20   Nanette Repress, NP   montelukast (SINGULAIR) 10 mg tablet Take 1 Tab by mouth daily. 3/20/20   Nanette Repress, NP   triamcinolone acetonide (KENALOG) 0.1 % topical cream Apply  to affected area two (2) times daily as needed for Skin Irritation or Itching. use thin layer to small areas 3/20/20   Nanette Repress, NP   dextromethorphan-guaiFENesin (Robitussin Cough-Chest Dequan DM) 5-100 mg/5 mL liqd Take 10 mL by mouth every four to six (4-6) hours as needed for Cough. 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 HENT: Negative for congestion, ear discharge, ear pain, nosebleeds, sinus pain and sore throat. Respiratory: Positive for cough.  Negative for hemoptysis, sputum production, shortness of breath and wheezing. Cardiovascular: Negative for chest pain, orthopnea and leg swelling. Neurological: Negative for headaches. Objective:   No flowsheet data found. General: alert, cooperative, no distress   Mental  status: normal mood, behavior, speech, dress, motor activity, and thought processes, able to follow commands   HENT: NCAT   Neck: no visualized mass   Resp: no respiratory distress   Neuro: no gross deficits   Skin: no discoloration or lesions of concern on visible areas   Psychiatric: normal affect, consistent with stated mood, no evidence of hallucinations     Additional exam findings: patient seen ambulating on camera, patient with appears well and moving and breathing without difficulty. No cough heard. We discussed the expected course, resolution and complications of the diagnosis(es) in detail. Medication risks, benefits, costs, interactions, and alternatives were discussed as indicated. I advised him to contact the office if his condition worsens, changes or fails to improve as anticipated. He expressed understanding with the diagnosis(es) and plan. Rosi Watts, who was evaluated through a patient-initiated, synchronous (real-time) audio-video encounter, and/or his healthcare decision maker, is aware that it is a billable service, with coverage as determined by his insurance carrier. He provided verbal consent to proceed: Yes, and patient identification was verified. It was conducted pursuant to the emergency declaration under the [address] [place] [address], [address] [place] [address] authority and the [employer] General Act. A caregiver was present when appropriate. Ability to conduct physical exam was limited. I was in the office. The patient was at home.       Jackie Helms NP

## 2020-08-06 DIAGNOSIS — R05.9 COUGH: Primary | ICD-10-CM

## 2020-08-17 ENCOUNTER — TELEPHONE (OUTPATIENT)
Dept: FAMILY MEDICINE CLINIC | Age: 45
End: 2020-08-17

## 2020-08-17 DIAGNOSIS — R05.3 CHRONIC COUGH: Primary | ICD-10-CM

## 2020-08-17 NOTE — TELEPHONE ENCOUNTER
Patient's mother called stating the cough medication that was sent in is not helping. She is requesting \"better\" cough medicine.

## 2020-08-18 NOTE — TELEPHONE ENCOUNTER
Left message on Ms.  Roberto (pt's mothers cellphone) that medication request was being denied and that a referral was being sent in for pulmonology

## 2020-08-18 NOTE — TELEPHONE ENCOUNTER
Not appropriate to fill cough medication with narcotic for chronic cough. I will refer to pulmonology now for further management.

## 2020-09-21 ENCOUNTER — HOSPITAL ENCOUNTER (OUTPATIENT)
Dept: LAB | Age: 45
Discharge: HOME | End: 2020-09-21
Payer: COMMERCIAL

## 2020-09-21 ENCOUNTER — OFFICE VISIT (OUTPATIENT)
Dept: FAMILY MEDICINE CLINIC | Age: 45
End: 2020-09-21

## 2020-09-21 VITALS
OXYGEN SATURATION: 97 % | BODY MASS INDEX: 28.32 KG/M2 | HEIGHT: 65 IN | SYSTOLIC BLOOD PRESSURE: 111 MMHG | HEART RATE: 64 BPM | TEMPERATURE: 97.8 F | RESPIRATION RATE: 20 BRPM | DIASTOLIC BLOOD PRESSURE: 77 MMHG | WEIGHT: 170 LBS

## 2020-09-21 DIAGNOSIS — R05.3 CHRONIC COUGH: ICD-10-CM

## 2020-09-21 DIAGNOSIS — R05.3 CHRONIC COUGH: Primary | ICD-10-CM

## 2020-09-21 DIAGNOSIS — Z01.89 ENCOUNTER FOR LABORATORY EXAMINATION: ICD-10-CM

## 2020-09-21 DIAGNOSIS — E03.9 ACQUIRED HYPOTHYROIDISM: ICD-10-CM

## 2020-09-21 DIAGNOSIS — Z23 ENCOUNTER FOR IMMUNIZATION: ICD-10-CM

## 2020-09-21 DIAGNOSIS — R73.03 PREDIABETES: ICD-10-CM

## 2020-09-21 LAB
BASOPHILS # BLD: 0.1 K/UL (ref 0–0.1)
BASOPHILS NFR BLD: 1 % (ref 0–2)
DIFFERENTIAL METHOD BLD: ABNORMAL
EOSINOPHIL # BLD: 0.1 K/UL (ref 0–0.4)
EOSINOPHIL NFR BLD: 1 % (ref 0–5)
ERYTHROCYTE [DISTWIDTH] IN BLOOD BY AUTOMATED COUNT: 14.9 % (ref 11.6–14.5)
EST. AVERAGE GLUCOSE BLD GHB EST-MCNC: 108 MG/DL
HBA1C MFR BLD: 5.4 % (ref 4.2–5.6)
HCT VFR BLD AUTO: 44.5 % (ref 36–48)
HGB BLD-MCNC: 15 G/DL (ref 13–16)
LYMPHOCYTES # BLD: 1.9 K/UL (ref 0.9–3.6)
LYMPHOCYTES NFR BLD: 41 % (ref 21–52)
MCH RBC QN AUTO: 31.6 PG (ref 24–34)
MCHC RBC AUTO-ENTMCNC: 33.7 G/DL (ref 31–37)
MCV RBC AUTO: 93.9 FL (ref 74–97)
MONOCYTES # BLD: 0.4 K/UL (ref 0.05–1.2)
MONOCYTES NFR BLD: 9 % (ref 3–10)
NEUTS SEG # BLD: 2.3 K/UL (ref 1.8–8)
NEUTS SEG NFR BLD: 48 % (ref 40–73)
PLATELET # BLD AUTO: 213 K/UL (ref 135–420)
PMV BLD AUTO: 10.5 FL (ref 9.2–11.8)
RBC # BLD AUTO: 4.74 M/UL (ref 4.7–5.5)
TSH SERPL DL<=0.05 MIU/L-ACNC: 2.17 UIU/ML (ref 0.36–3.74)
WBC # BLD AUTO: 4.6 K/UL (ref 4.6–13.2)

## 2020-09-21 PROCEDURE — 84443 ASSAY THYROID STIM HORMONE: CPT

## 2020-09-21 PROCEDURE — 83036 HEMOGLOBIN GLYCOSYLATED A1C: CPT

## 2020-09-21 PROCEDURE — 85025 COMPLETE CBC W/AUTO DIFF WBC: CPT

## 2020-09-21 RX ORDER — BENZONATATE 100 MG/1
CAPSULE ORAL
COMMUNITY
Start: 2020-09-06 | End: 2021-02-05 | Stop reason: ALTCHOICE

## 2020-09-21 NOTE — PROGRESS NOTES
Irvin Almonte presents today for   Chief Complaint   Patient presents with    Follow Up Chronic Condition       Is someone accompanying this pt? Yes   mother    Is the patient using any DME equipment during [address]? no    Depression Screening:  3 most recent PHQ Screens 9/21/2020   PHQ Not Done -   Little interest or pleasure in doing things Not at all   Feeling down, depressed, irritable, or hopeless Not at all   Total Score PHQ 2 0       Learning Assessment:  No flowsheet data found. Abuse Screening:  Abuse Screening Questionnaire 11/21/2019   Do you ever feel afraid of your partner? N   Are you in a relationship with someone who physically or mentally threatens you? N   Is it safe for you to go home? Y       Fall Risk  Fall Risk Assessment, last 12 mths 11/21/2019   Able to walk? Yes   Fall in past 12 months? No       Health Maintenance reviewed and discussed and ordered per Provider. Health Maintenance Due   Topic Date Due    Flu Vaccine (1) 09/01/2020   . Coordination of Care:  1. Have you been to the ER, urgent care clinic since your last visit? Hospitalized since your last visit? Yes   Er cough negative covid test    2. Have you seen or consulted any other health care providers outside of the [address] [place] [address] since your last visit? Include any pap smears or colon screening.  no

## 2020-09-21 NOTE — PROGRESS NOTES
PROBLEM/SICK OFFICE NOTE (SOAP)    9/21/2020  8:45 AM    SUBJECTIVE:    Chief Complaint   Patient presents with    Follow Up Chronic Condition       HPI:  Feliz Olsen is a 39 y.o. male presenting today for office visit. Follow up on routine care today. History for Down's Syndrome, hypothyroid, prediabetes, blindness, nonverbal. He lives with mother whom he presents with today. She reports Juanis Charles recently was seen in ER for reaction to amoxicillin. Amoxicillin given by dentist following procedure. She reports he broke out in rash. Either at ER or at urgent care, also with concern for cough (which is chronic in nature)- COVID test negative. She states he received cough medicine with codeine in it and that caused him to have seizure. Then went to ER and they stated, exam normal- stop cough syrup and use tessalon pearls. Mother is giving zuly tessalon pearls but she reports still with cough. None observed in room and lungs CTA. Unfortunately zuly's chronic cough has been a complaint at every visit- he had repeat chest xray which shows stable density seen previously. I had referred to pulmonology but he has not been. Contact information given to mother today. Hypothyroid- presently not taking thryoid replacement. Last TSH normal. Will recheck. Lab Results   Component Value Date/Time    TSH 2.47 03/20/2020 08:50 AM     Prediabetes- noted with last lab work. Will recheck a1c. Lab Results   Component Value Date/Time    Hemoglobin A1c 5.7 (H) 03/20/2020 08:50 AM         Review of Systems:  Review of Systems   Constitutional: Negative for activity change, diaphoresis, fatigue and fever. HENT: Positive for congestion. Respiratory: Positive for cough. Negative for choking, shortness of breath and wheezing. Cardiovascular: Negative for chest pain and leg swelling. Gastrointestinal: Negative for abdominal pain, diarrhea and vomiting. Genitourinary: Negative for difficulty urinating. Neurological: Negative for weakness. Psychiatric/Behavioral: Negative for agitation and behavioral problems.          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       Social History     Socioeconomic History    Marital status: UNKNOWN     Spouse name: Not on file    Number of children: Not on file    Years of education: Not on file    Highest education level: Not on file   Occupational History    Not on file   Social Needs    Financial resource strain: Not on file    Food insecurity     Worry: Not on file     Inability: Not on file   [employer] needs     Medical: Not on file     Non-medical: Not on file   Tobacco Use    Smoking status: Never Smoker    Smokeless tobacco: Never Used   Substance and Sexual Activity    Alcohol use: No    Drug use: No    Sexual activity: Never   Lifestyle    Physical activity     Days per week: Not on file     Minutes per session: Not on file    Stress: Not on file   Relationships    Social connections     Talks on phone: Not on file     Gets together: Not on file     Attends Taoist service: Not on file     Active member of club or organization: Not on file     Attends meetings of clubs or organizations: Not on file     Relationship status: Not on file    Intimate partner violence     Fear of current or ex partner: Not on file     Emotionally abused: Not on file     Physically abused: Not on file     Forced sexual activity: Not on file   Other Topics Concern    Not on file   Social History Narrative    Not on file       Allergies   Allergen Reactions    Codeine Seizures    Tramadol Seizures     Seizures       Current Outpatient Medications   Medication Sig Dispense Refill    benzonatate (TESSALON) 100 mg capsule Take 1 capsule 3 times daily as needed for coughing, swallow capsules whole.  cetirizine (ZYRTEC) 10 mg tablet Take 1 Tab by mouth daily. 30 Tab 11    montelukast (SINGULAIR) 10 mg tablet Take 1 Tab by mouth daily. 30 Tab 9    triamcinolone acetonide (KENALOG) 0.1 % topical cream Apply  to affected area two (2) times daily as needed for Skin Irritation or Itching. use thin layer to small areas 45 g 0    dextromethorphan-guaiFENesin (Robitussin Cough-Chest Dequan DM) 5-100 mg/5 mL liqd Take 10 mL by mouth every four to six (4-6) hours as needed for Cough. 1 Bottle 0           Patient Care Team:  Patient Care Team:  Nanette Carcamo NP as PCP - General (Nurse Practitioner)  Nanette Carcamo NP as PCP - [facility] Empaneled Provider        OBJECTIVE:    Vitals:    09/21/20 0828   BP: 111/77   Pulse: 64   Resp: 20   Temp: 97.8 °F (36.6 °C)   TempSrc: Temporal   SpO2: 97%   Weight: 170 lb (77.1 kg)   Height: 5' 5\" (1.651 m)   PainSc:   0 - No pain       Physical Exam        Assessment & Plan:    1. Chronic cough  Follow up with pulmonary as previously ordered    - CBC WITH AUTOMATED DIFF; Future    2. Prediabetes  Labs today     - HEMOGLOBIN A1C WITH EAG; Future  - COLLECTION VENOUS BLOOD,VENIPUNCTURE    3. Acquired hypothyroidism  Labs today     - TSH 3RD GENERATION; Future  - COLLECTION VENOUS BLOOD,VENIPUNCTURE    4. Encounter for immunization  Flu shot given today    - INFLUENZA VIRUS VAC QUAD,SPLIT,PRESV FREE SYRINGE IM  - MI IMMUNIZ ADMIN,1 SINGLE/COMB VAC/TOXOID  - COLLECTION VENOUS BLOOD,VENIPUNCTURE    5.  Encounter for laboratory examination  Labs as above    - COLLECTION VENOUS BLOOD,VENIPUNCTURE        Orders Placed This Encounter    MI IMMUNIZ ADMIN,1 SINGLE/COMB VAC/TOXOID    INFLUENZA VIRUS VACCINE QUADRIVALENT, PRESERVATIVE FREE SYRINGE (56554)    TSH 3RD GENERATION     Standing Status:   Future     Standing Expiration Date:   12/20/2020    HEMOGLOBIN A1C WITH EAG     Standing Status:   Future     Standing Expiration Date:   12/20/2020    CBC WITH AUTOMATED DIFF     Standing Status:   Future     Standing Expiration Date:   9/22/2021    COLLECTION VENOUS BLOOD,VENIPUNCTURE    benzonatate (TESSALON) 100 mg capsule     Sig: Take 1 capsule 3 times daily as needed for coughing, swallow capsules whole. Follow-up and Dispositions    · Return for keep apt with ARLEEN Hayes. Plan of care reviewed with patient. Patient in agreement with plan and expresses understanding. I have discussed when to anticipate results and how results will be communicated, if applicable. Anticipatory guidance given and questions answered, patient encouraged to call or RTO if further questions or concerns.     Alphonso Lloyd NP  09/21/20

## 2020-09-21 NOTE — PROGRESS NOTES
Patient presents for lab draw ordered by Ministerio Costa  Ordering Department/Practice:  [facility]  Date Ordered:  9-     The following labs were drawn and sent to [facility]     CBC, TSH, 3rd Generation and HgA1C    The following tubes were sent:    Gold  ( 1) and Lavender  ( 1)    Draw site:  LAC  Pain Level:0  Needle Gauge23  Aseptic technique used  Blood thinners:n  Band-Aid applied  Draw fee added   Procedure tolerated well, patient voiced no complaints.

## 2020-09-21 NOTE — PATIENT INSTRUCTIONS
Follow up with pulmonary- You have been referred to: 
Prabhu Sanchez. [facility] [address]  [address] Phone: [phone]

## 2020-09-21 NOTE — PROGRESS NOTES
After obtaining consent, and per orders of Breezy Lindsey NP, injection of [place] [employer] (flu vaccine) was given by Stephanie Esqueda. Patient instructed to remain in clinic for 20 minutes afterwards, and to report any adverse reaction to me immediately.

## 2020-09-22 NOTE — PROGRESS NOTES
Please call mother and let her know zuly's labs are all reassuring. A1c normal, no diabetes.  Thyroid function normal. Blood count normal.

## 2020-09-29 NOTE — PROGRESS NOTES
After obtaining identifiers patient's mother (Ms. Ilene Strong) was advised of all lab results on this patient.   She verbalized understanding

## 2020-10-30 ENCOUNTER — TELEPHONE (OUTPATIENT)
Dept: FAMILY MEDICINE CLINIC | Age: 45
End: 2020-10-30

## 2020-10-30 NOTE — TELEPHONE ENCOUNTER
Patient has a cough/cold. He was taken to Urgent Care and given Tussin w/ Codine and Amoxicillin. She believes these two taken together threw him into a seizure. He tested negative for Covid. She is requesting something for his runny nose.

## 2020-11-02 NOTE — TELEPHONE ENCOUNTER
Spoke with patient parent at this time. Patient is doing better at this time. No other concerns noted

## 2020-11-20 ENCOUNTER — VIRTUAL VISIT (OUTPATIENT)
Dept: FAMILY MEDICINE CLINIC | Age: 45
End: 2020-11-20
Payer: COMMERCIAL

## 2020-11-20 DIAGNOSIS — Z71.89 ACP (ADVANCE CARE PLANNING): ICD-10-CM

## 2020-11-20 DIAGNOSIS — Z00.00 MEDICARE ANNUAL WELLNESS VISIT, SUBSEQUENT: ICD-10-CM

## 2020-11-20 DIAGNOSIS — Z13.31 SCREENING FOR DEPRESSION: ICD-10-CM

## 2020-11-20 PROCEDURE — G0444 DEPRESSION SCREEN ANNUAL: HCPCS | Performed by: NURSE PRACTITIONER

## 2020-11-20 PROCEDURE — G0439 PPPS, SUBSEQ VISIT: HCPCS | Performed by: NURSE PRACTITIONER

## 2020-11-20 NOTE — PROGRESS NOTES
This is the Subsequent Medicare Annual Wellness Exam, performed 12 months or more after the Initial AWV or the last Subsequent AWV    I have reviewed the patient's medical history in detail and updated the computerized patient record. Depression Risk Factor Screening:     3 most recent PHQ Screens 11/20/2020   PHQ Not Done -   Little interest or pleasure in doing things Not at all   Feeling down, depressed, irritable, or hopeless Not at all   Total Score PHQ 2 0       Alcohol Risk Screen   Do you average more than 2 drinks per night or 14 drinks a week: No    On any one occasion in the past three months have you have had more than 4 drinks containing alcohol:  No        Functional Ability and Level of Safety:   Hearing: Hearing is good. Activities of Daily Living: The home contains: no safety equipment. Patient needs help with:  dressing, bathing and hygiene     Ambulation: with no difficulty     Fall Risk:  Fall Risk Assessment, last 12 mths 11/21/2019   Able to walk? Yes   Fall in past 12 months? No     Abuse Screen:  Patient is not abused       Cognitive Screening   Has your family/caregiver stated any concerns about your memory: no    Cognitive Screening: Unable to do Patient have down syndrome. His mother teofilo ramirez takes care of him and answers for him    Assessment/Plan   Education and counseling provided:  Are appropriate based on today's review and evaluation  No patient is unable to answer any questions properly. Mother is preforming his [address] today on his behalf    Diagnoses and all orders for this visit:    1. Medicare annual wellness visit, subsequent    2. Screening for depression  -     DEPRESSION SCREEN ANNUAL    3.  ACP (advance care planning)        Health Maintenance Due     Health Maintenance Due   Topic Date Due    Medicare Yearly Exam  11/21/2020       Patient Care Team   Patient Care Team:  Gilbert Thakur NP as PCP - General (Nurse Practitioner)  Gilbert Thakur MICHAEL NP as PCP - [facility] Provider    History     Patient Active Problem List   Diagnosis Code    Down syndrome Q90.9    Low TSH level R79.89    Not immune to hepatitis B virus Z78.9    Prediabetes R73.03    Blindness H54.7     Past Medical History:   Diagnosis Date    Down syndrome     Prediabetes 09/2018    initial A1c 5.7%      Past Surgical History:   Procedure Laterality Date    HX OTHER SURGICAL Right 1979    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  cetirizine (ZYRTEC) 10 mg tablet Take 1 Tab by mouth daily. 30 Tab 11    montelukast (SINGULAIR) 10 mg tablet Take 1 Tab by mouth daily. 30 Tab 9    triamcinolone acetonide (KENALOG) 0.1 % topical cream Apply  to affected area two (2) times daily as needed for Skin Irritation or Itching. use thin layer to small areas 45 g 0     Allergies   Allergen Reactions    Amoxicillin Hives    Codeine Seizures    Tramadol Seizures     Seizures       Family History   Problem Relation Age of Onset    Schizophrenia Mother     No Known Problems Father     No Known Problems Sister     No Known Problems Sister      Social History     Tobacco Use    Smoking status: Never Smoker    Smokeless tobacco: Never Used   Substance Use Topics    Alcohol use: No       Loulou Barahona, who was evaluated through a synchronous (real-time) audio only encounter, and/or his healthcare decision maker, is aware that it is a billable service, with coverage as determined by his insurance carrier. He provided verbal consent to proceed: Yes, and patient identification was verified. It was conducted pursuant to the emergency declaration under the [facility], [address] [place] [address] authority and the [employer] Act.  A caregiver was present when appropriate. Ability to conduct physical exam was limited. I was in the office. The patient was at home.     Colton Farris, NP

## 2020-11-20 NOTE — PATIENT INSTRUCTIONS
Medicare Wellness Visit, Male The best way to live healthy is to have a lifestyle where you eat a well-balanced diet, exercise regularly, limit alcohol use, and quit all forms of tobacco/nicotine, if applicable. Regular preventive services are another way to keep healthy. Preventive services (vaccines, screening tests, monitoring & exams) can help personalize your care plan, which helps you manage your own care. Screening tests can find health problems at the earliest stages, when they are easiest to treat. [facility] follows the current, evidence-based guidelines published by the [facility] ([facility]) when recommending preventive services for our patients. Because we follow these guidelines, sometimes recommendations change over time as research supports it. (For example, a prostate screening blood test is no longer routinely recommended for men with no symptoms). Of course, you and your doctor may decide to screen more often for some diseases, based on your risk and co-morbidities (chronic disease you are already diagnosed with). Preventive services for you include: - Medicare offers their members a free annual wellness visit, which is time for you and your primary care provider to discuss and plan for your preventive service needs. Take advantage of this benefit every year! 
-All adults over age 72 should receive the recommended pneumonia vaccines. Current USPSTF guidelines recommend a series of two vaccines for the best pneumonia protection.  
-All adults should have a flu vaccine yearly and tetanus vaccine every 10 years. 
-All adults age 48 and older should receive the shingles vaccines (series of two vaccines).       
-All adults age 38-68 who are overweight should have a diabetes screening test once every three years.  
-Other screening tests & preventive services for persons with diabetes include: an eye exam to screen for diabetic retinopathy, a kidney function test, a foot exam, and stricter control over your cholesterol.  
-Cardiovascular screening for adults with routine risk involves an electrocardiogram (ECG) at intervals determined by the provider.  
-Colorectal cancer screening should be done for adults age 54-65 with no increased risk factors for colorectal cancer. There are a number of acceptable methods of screening for this type of cancer. Each test has its own benefits and drawbacks. Discuss with your provider what is most appropriate for you during your annual wellness visit. The different tests include: colonoscopy (considered the best screening method), a fecal occult blood test, a fecal DNA test, and sigmoidoscopy. 
-All adults born between [facility] should be screened once for Hepatitis C. 
-An Abdominal Aortic Aneurysm (AAA) Screening is recommended for men age 73-68 who has ever smoked in their lifetime. Here is a list of your current Health Maintenance items (your personalized list of preventive services) with a due date: 
Health Maintenance Due Topic Date Due  
 Annual Well Visit  11/21/2020 Medicare Wellness Visit, Male The best way to live healthy is to have a lifestyle where you eat a well-balanced diet, exercise regularly, limit alcohol use, and quit all forms of tobacco/nicotine, if applicable. Regular preventive services are another way to keep healthy. Preventive services (vaccines, screening tests, monitoring & exams) can help personalize your care plan, which helps you manage your own care. Screening tests can find health problems at the earliest stages, when they are easiest to treat. [facility] follows the current, evidence-based guidelines published by the [facility] (USPSTF) when recommending preventive services for our patients.  Because we follow these guidelines, sometimes recommendations change over time as research supports it. (For example, a prostate screening blood test is no longer routinely recommended for men with no symptoms). Of course, you and your doctor may decide to screen more often for some diseases, based on your risk and co-morbidities (chronic disease you are already diagnosed with). Preventive services for you include: - Medicare offers their members a free annual wellness visit, which is time for you and your primary care provider to discuss and plan for your preventive service needs. Take advantage of this benefit every year! 
-All adults over age 72 should receive the recommended pneumonia vaccines. Current USPSTF guidelines recommend a series of two vaccines for the best pneumonia protection.  
-All adults should have a flu vaccine yearly and tetanus vaccine every 10 years. 
-All adults age 48 and older should receive the shingles vaccines (series of two vaccines). -All adults age 38-68 who are overweight should have a diabetes screening test once every three years.  
-Other screening tests & preventive services for persons with diabetes include: an eye exam to screen for diabetic retinopathy, a kidney function test, a foot exam, and stricter control over your cholesterol.  
-Cardiovascular screening for adults with routine risk involves an electrocardiogram (ECG) at intervals determined by the provider.  
-Colorectal cancer screening should be done for adults age 54-65 with no increased risk factors for colorectal cancer. There are a number of acceptable methods of screening for this type of cancer. Each test has its own benefits and drawbacks. Discuss with your provider what is most appropriate for you during your annual wellness visit. The different tests include: colonoscopy (considered the best screening method), a fecal occult blood test, a fecal DNA test, and sigmoidoscopy. -All adults born between [birth year] and 1965 should be screened once for Hepatitis C. 
-An Abdominal Aortic Aneurysm (AAA) Screening is recommended for men age 73-68 who has ever smoked in their lifetime. Here is a list of your current Health Maintenance items (your personalized list of preventive services) with a due date: 
Health Maintenance Due Topic Date Due  
 Annual Well Visit  11/21/2020

## 2020-11-20 NOTE — ACP (ADVANCE CARE PLANNING)
Advance Care Planning       Advance Care Planning (ACP) Physician/NP/PA (Provider) Conversation        Date of ACP Conversation: 11/20/2020    Conversation Conducted with:   Patient with Decision Making Capacity and Healthcare Decision Maker: Named in Advance Directive or Healthcare Power of [address]:    Current Designated Health Care Decision Maker:   (If there is a [address] named in the [address]" box in the ACP activity, but it is not visible above, be sure to open that field and then select the health care decision maker relationship (ie \"primary\") in the blank space to the right of the name.)    Note: Assess and validate information in current ACP documents, as indicated. If no Authorized Decision Maker has previously been identified, then patient chooses [place] [address]:  \"Who would you like to name as your primary health care decision-maker? \"    Name: Nan Latif   Relationship: Mother  Phone number: [phone]  Jamal Lover this person be reached easily? \" YES  \"Who would you like to name as your back-up decision maker? \"   Name: Javad Elizondo Relationship: daughter  Phone number: [phone]  Jamal Lover this person be reached easily? \" YES    Note: If the relationship of these Decision-Makers to the patient does NOT follow your state's Next of Kin hierarchy, recommend that patient complete ACP document that meets state-specific requirements to allow them to act on the patient's behalf when appropriate. Care Preferences:    Hospitalization: \"If your health worsens and it becomes clear that your chance of recovery is unlikely, what would your preference be regarding hospitalization? \"  The patient would prefer comfort-focused treatment without hospitalization. Ventilation:   \"If you were in your present state of health and suddenly became very ill and were unable to breathe on your own, what would your preference be about the use of a ventilator (breathing machine) if it was available to you? \"    The patient would desire the use of a ventilator. \"If your health worsens and it becomes clear that your chance of recovery is unlikely, what would your preference be about the use of a ventilator (breathing machine) if it was available to you? \"   yes      Resuscitation:  \"CPR works best to restart the heart when there is a sudden event, like a heart attack, in someone who is otherwise healthy. Unfortunately, CPR does not typically restart the heart for people who have serious health conditions or who are very sick. \"    \"In the event your heart stopped as a result of an underlying serious health condition, would you want attempts to be made to restart your heart (answer \"yes\" for attempt to resuscitate) or would you prefer a natural death (answer \"no\" for do not attempt to resuscitate)? \"   Yes, attempt to resuscitate. NOTE: If the patient has a valid advance directive AND provides care preference(s) that are inconsistent with that prior directive, advise the patient to consider either: creating a new advance directive that complies with state-specific requirements; or, if that is not possible, orally revoking that prior directive in accordance with state-specific requirements, which must be documented in the EHR.     Conversation Outcomes / Follow-Up Plan:   ACP complete - no further action today      Length of Voluntary ACP Conversation in minutes:  <16 minutes (Non-Billable)      Alfonso Lora NP

## 2021-01-26 ENCOUNTER — TELEPHONE (OUTPATIENT)
Dept: FAMILY MEDICINE CLINIC | Age: 46
End: 2021-01-26

## 2021-01-26 NOTE — TELEPHONE ENCOUNTER
----- Message from Fabiana Timmons sent at 1/26/2021 10:37 AM EST -----  Regarding: ofc visit  Zacarias cl to sched an ofc visit w [facility]

## 2021-02-05 ENCOUNTER — VIRTUAL VISIT (OUTPATIENT)
Dept: FAMILY MEDICINE CLINIC | Age: 46
End: 2021-02-05
Payer: MEDICARE

## 2021-02-05 DIAGNOSIS — Z76.0 MEDICATION REFILL: ICD-10-CM

## 2021-02-05 DIAGNOSIS — R05.9 COUGH: ICD-10-CM

## 2021-02-05 DIAGNOSIS — J30.1 ALLERGIC RHINITIS DUE TO POLLEN, UNSPECIFIED SEASONALITY: ICD-10-CM

## 2021-02-05 PROCEDURE — G8428 CUR MEDS NOT DOCUMENT: HCPCS | Performed by: NURSE PRACTITIONER

## 2021-02-05 PROCEDURE — 99212 OFFICE O/P EST SF 10 MIN: CPT | Performed by: NURSE PRACTITIONER

## 2021-02-05 PROCEDURE — G8432 DEP SCR NOT DOC, RNG: HCPCS | Performed by: NURSE PRACTITIONER

## 2021-02-05 RX ORDER — CETIRIZINE HCL 10 MG
10 TABLET ORAL DAILY
Qty: 30 TAB | Refills: 11 | Status: SHIPPED | OUTPATIENT
Start: 2021-02-05 | End: 2021-06-23 | Stop reason: CLARIF

## 2021-02-05 NOTE — PROGRESS NOTES
Candido Gaviria is a 39 y.o. male who was seen by synchronous (real-time) audio-video technology on 2/5/2021 for Cough (x 4 days productive cough especially at night)        Assessment & Plan:   Diagnoses and all orders for this visit:    1. Allergic rhinitis due to pollen, unspecified seasonality  -     cetirizine (ZYRTEC) 10 mg tablet; Take 1 Tab by mouth daily. 2. Cough  -     cetirizine (ZYRTEC) 10 mg tablet; Take 1 Tab by mouth daily. 3. Medication refill  -     cetirizine (ZYRTEC) 10 mg tablet; Take 1 Tab by mouth daily. I spent at least 15 minutes on this visit with this established patient. 712  Subjective:   Patient is being seen today for a cough that he has had for 4 days. Accompanied to this visit with his mother. Patient have down syndrome. Allergic Rhinitis  Patient has been having a cough for the last 4 days. He does have a history of allergies. His mother states he is having a cough mainly at night and have facial congestion. She started giving him Delysm on Monday to help with his cough and it have helped minimally. He does take Singulair at night due to his allergies. Informed her to continue the Delsym and also use a decongestant (she states she have mucinex DM at home). Also advised to start back using a daily antihistamine. Advised to use a humidifier at night and have him increase his water intake. Explained that his symptoms are likely still viral but if he is not getting better in the next 5 to call and let me know he may need a antibiotic at that point. She agreed with the plan of care. Prior to Admission medications    Medication Sig Start Date End Date Taking? Authorizing Provider   benzonatate (TESSALON) 100 mg capsule Take 1 capsule 3 times daily as needed for coughing, swallow capsules whole. 9/6/20   Provider, Honorio   cetirizine (ZYRTEC) 10 mg tablet Take 1 Tab by mouth daily.  3/20/20   Valencia Chavez NP   montelukast (SINGULAIR) 10 mg tablet Take 1 Tab by mouth daily. 3/20/20   Pj Peterson NP   triamcinolone acetonide (KENALOG) 0.1 % topical cream Apply  to affected area two (2) times daily as needed for Skin Irritation or Itching. use thin layer to small areas 3/20/20   Pj Peterson NP     Patient Active Problem List   Diagnosis Code    Down syndrome Q90.9    Low TSH level R79.89    Not immune to hepatitis B virus Z78.9    Prediabetes R73.03    Blindness H54.7     Current Outpatient Medications   Medication Sig Dispense Refill    cetirizine (ZYRTEC) 10 mg tablet Take 1 Tab by mouth daily. 30 Tab 11    montelukast (SINGULAIR) 10 mg tablet Take 1 Tab by mouth daily. 30 Tab 9    triamcinolone acetonide (KENALOG) 0.1 % topical cream Apply  to affected area two (2) times daily as needed for Skin Irritation or Itching. use thin layer to small areas 45 g 0     Allergies   Allergen Reactions    Amoxicillin Hives    Codeine Seizures    Tramadol Seizures     Seizures     Family History   Problem Relation Age of Onset    Schizophrenia Mother     No Known Problems Father     No Known Problems Sister     No Known Problems Sister        Review of Systems   Constitutional: Negative for chills and malaise/fatigue. HENT: Positive for congestion. Respiratory: Positive for cough. Negative for shortness of breath and wheezing. Cardiovascular: Negative for chest pain and palpitations. Skin: Negative. Neurological: Negative.         Objective:     Patient-Reported Vitals 2/5/2021   Patient-Reported Weight 176      General: alert, cooperative, no distress   Mental  status: normal mood, behavior, speech, dress, motor activity, and thought processes, able to follow commands   HENT: NCAT   Neck: no visualized mass   Resp: no respiratory distress   Neuro: no gross deficits   Skin: no discoloration or lesions of concern on visible areas   Psychiatric: normal affect, consistent with stated mood, no evidence of hallucinations Additional exam findings: We discussed the expected course, resolution and complications of the diagnosis(es) in detail. Medication risks, benefits, costs, interactions, and alternatives were discussed as indicated. I advised him to contact the office if his condition worsens, changes or fails to improve as anticipated. He expressed understanding with the diagnosis(es) and plan. Will Doyle, who was evaluated through a patient-initiated, synchronous (real-time) audio-video encounter, and/or his healthcare decision maker, is aware that it is a billable service, with coverage as determined by his insurance carrier. He provided verbal consent to proceed: Yes, and patient identification was verified. It was conducted pursuant to the emergency declaration under the [address] [place] [address] authority and the [employer] General Act. A caregiver was present when appropriate. Ability to conduct physical exam was limited. I was in the office. The patient was at home.       Chantell Fallon, ARLEEN

## 2021-02-05 NOTE — PROGRESS NOTES
Patient presents today with his mother, and she advises he has had a productive cough especially at night.

## 2021-02-26 DIAGNOSIS — R05.9 COUGH: Primary | ICD-10-CM

## 2021-02-26 RX ORDER — GUAIFENESIN DEXTROMETHORPHAN HYDROBROMIDE ORAL SOLUTION 10; 100 MG/5ML; MG/5ML
5 SOLUTION ORAL
Qty: 236 ML | Refills: 0 | Status: SHIPPED | OUTPATIENT
Start: 2021-02-26 | End: 2021-02-26 | Stop reason: CLARIF

## 2021-02-26 RX ORDER — GUAIFENESIN DEXTROMETHORPHAN HYDROBROMIDE ORAL SOLUTION 10; 100 MG/5ML; MG/5ML
5 SOLUTION ORAL
Qty: 236 ML | Refills: 0 | Status: SHIPPED | OUTPATIENT
Start: 2021-02-26 | End: 2021-03-16 | Stop reason: SDUPTHER

## 2021-03-12 ENCOUNTER — VIRTUAL VISIT (OUTPATIENT)
Dept: FAMILY MEDICINE CLINIC | Age: 46
End: 2021-03-12

## 2021-03-12 NOTE — PROGRESS NOTES
Erroneous encounter - Patient have not responded back to the telemedicine links to start appointment. Three links sent in over a 15 min timeframe. Patient will be notified to reschedule appt.

## 2021-03-16 ENCOUNTER — VIRTUAL VISIT (OUTPATIENT)
Dept: FAMILY MEDICINE CLINIC | Age: 46
End: 2021-03-16
Payer: MEDICARE

## 2021-03-16 DIAGNOSIS — Z71.89 ACP (ADVANCE CARE PLANNING): ICD-10-CM

## 2021-03-16 DIAGNOSIS — Z13.31 SCREENING FOR DEPRESSION: ICD-10-CM

## 2021-03-16 DIAGNOSIS — Z00.00 MEDICARE ANNUAL WELLNESS VISIT, SUBSEQUENT: ICD-10-CM

## 2021-03-16 DIAGNOSIS — R21 RASH AND NONSPECIFIC SKIN ERUPTION: ICD-10-CM

## 2021-03-16 DIAGNOSIS — R05.9 COUGH: ICD-10-CM

## 2021-03-16 PROCEDURE — G8432 DEP SCR NOT DOC, RNG: HCPCS | Performed by: NURSE PRACTITIONER

## 2021-03-16 PROCEDURE — G8417 CALC BMI ABV UP PARAM F/U: HCPCS | Performed by: NURSE PRACTITIONER

## 2021-03-16 PROCEDURE — G0439 PPPS, SUBSEQ VISIT: HCPCS | Performed by: NURSE PRACTITIONER

## 2021-03-16 PROCEDURE — 99212 OFFICE O/P EST SF 10 MIN: CPT | Performed by: NURSE PRACTITIONER

## 2021-03-16 PROCEDURE — G8427 DOCREV CUR MEDS BY ELIG CLIN: HCPCS | Performed by: NURSE PRACTITIONER

## 2021-03-16 RX ORDER — TRIAMCINOLONE ACETONIDE 1 MG/G
CREAM TOPICAL
Qty: 45 G | Refills: 0 | Status: SHIPPED | OUTPATIENT
Start: 2021-03-16 | End: 2021-06-23 | Stop reason: SDUPTHER

## 2021-03-16 RX ORDER — GUAIFENESIN DEXTROMETHORPHAN HYDROBROMIDE ORAL SOLUTION 10; 100 MG/5ML; MG/5ML
5 SOLUTION ORAL
Qty: 473 ML | Refills: 0 | Status: SHIPPED | OUTPATIENT
Start: 2021-03-16 | End: 2022-02-20

## 2021-03-16 NOTE — ACP (ADVANCE CARE PLANNING)
Advance Care Planning     Advance Care Planning (ACP) Physician/NP/PA Conversation      Date of Conversation: 3/16/2021  Conducted with: Patient with Decision Making Capacity    Healthcare Decision Maker:     Click here to complete [place] including selection of the [place] Relationship (ie \"Primary\")  Today we documented Decision Maker(s) consistent with Legal Next of Kin hierarchy. Care Preferences:    Hospitalization: \"If your health worsens and it becomes clear that your chance of recovery is unlikely, what would be your preference regarding hospitalization? \"  The patient would prefer comfort-focused treatment without hospitalization. Ventilation: \"If you were unable to breathe on your own and your chance of recovery was unlikely, what would be your preference about the use of a ventilator (breathing machine) if it was available to you? \"   The patient would desire the use of a ventilator. Resuscitation: \"In the event your heart stopped as a result of an underlying serious health condition, would you want attempts to be made to restart your heart, or would you prefer a natural death? \"   No, do NOT attempt to resuscitate.     Additional topics discussed: treatment goals    Conversation Outcomes / Follow-Up Plan:   ACP complete - no further action today  Reviewed DNR/DNI and patient elects Full Code (Attempt Resuscitation)     Length of Voluntary ACP Conversation in minutes:  <16 minutes (Non-Billable)    Kristi Larkin NP

## 2021-03-16 NOTE — PROGRESS NOTES
Bert Rao is a 55 y.o. male who was seen by synchronous (real-time) audio-video technology on 3/16/2021 for Annual Wellness Visit        Assessment & Plan:   Diagnoses and all orders for this visit:    1. Rash and nonspecific skin eruption  -     triamcinolone acetonide (KENALOG) 0.1 % topical cream; Apply  to affected area two (2) times daily as needed for Skin Irritation or Itching. use thin layer to small areas    2. Cough  -     guaiFENesin-dextromethorphan (TUSSI-ORGANIDIN DM)  mg/5 mL liqd; Take 5 mL by mouth three (3) times daily as needed for Cough or Congestion. I spent at least 15 minutes on this visit with this established patient. 712  Subjective: Mother is accompanying patient     Cough due to allergies  Patient does have a history allergies and post nasal drainage. He uses zyrtec 10 mg daily. She states the guaifenesin-dextromethorphan only but minimally. He also takes montelukast daily. Advised mother that he may need to follow up with a ENT to further workup for his allergies. She states he only cough during a change of the weather. Mother denies patient being SOB or chest pain. She states he does use a humidifier at night as well. Today will refill medication for his cough and place referral to ENT for his allergies. Mother declined inhaler because she states he will not use it.      3 most recent PHQ Screens 3/16/2021   PHQ Not Done -   Little interest or pleasure in doing things Not at all   Feeling down, depressed, irritable, or hopeless Not at all   Total Score PHQ 2 0   Trouble falling or staying asleep, or sleeping too much Not at all   Feeling tired or having little energy Not at all   Poor appetite, weight loss, or overeating Not at all   Feeling bad about yourself - or that you are a failure or have let yourself or your family down Not at all   Trouble concentrating on things such as school, work, reading, or watching TV Several days   Moving or speaking so slowly that other people could have noticed; or the opposite being so fidgety that others notice Not at all   Thoughts of being better off dead, or hurting yourself in some way Not at all   PHQ 9 Score 1   How difficult have these problems made it for you to do your work, take care of your home and get along with others Not difficult at all         Prior to Admission medications    Medication Sig Start Date End Date Taking? Authorizing Provider   triamcinolone acetonide (KENALOG) 0.1 % topical cream Apply  to affected area two (2) times daily as needed for Skin Irritation or Itching. use thin layer to small areas 3/16/21  Yes Abigail Herron NP   guaiFENesin-dextromethorphan (TUSSI-ORGANIDIN DM)  mg/5 mL liqd Take 5 mL by mouth three (3) times daily as needed for Cough or Congestion. 3/16/21  Yes Abigail Herron NP   cetirizine (ZYRTEC) 10 mg tablet Take 1 Tab by mouth daily. 2/5/21  Yes Abigail Herron NP   montelukast (SINGULAIR) 10 mg tablet Take 1 Tab by mouth daily. 3/20/20  Yes Love Prado NP     Patient Active Problem List   Diagnosis Code    Down syndrome Q90.9    Low TSH level R79.89    Not immune to hepatitis B virus Z78.9    Prediabetes R73.03    Blindness H54.7     Patient Active Problem List    Diagnosis Date Noted    Prediabetes 09/14/2018    Low TSH level 09/11/2018    Not immune to hepatitis B virus 09/11/2018    Down syndrome 09/07/2018    Blindness 05/29/2015     Allergies   Allergen Reactions    Amoxicillin Hives    Codeine Seizures    Tramadol Seizures     Seizures     Past Surgical History:   Procedure Laterality Date    HX OTHER SURGICAL Right 1979    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 Eyes: Negative. Respiratory: Positive for cough. Negative for shortness of breath and wheezing. Cardiovascular: Negative for chest pain, palpitations and leg swelling. Musculoskeletal: Negative. Skin: Negative. Neurological: Negative. Objective:     Patient-Reported Vitals 3/16/2021   Patient-Reported Weight 172 lbs      General: alert, cooperative, no distress   Mental  status: normal mood, behavior, speech, dress, motor activity, and thought processes, able to follow commands   HENT: NCAT   Neck: no visualized mass   Resp: no respiratory distress   Neuro: no gross deficits   Skin: no discoloration or lesions of concern on visible areas   Psychiatric: normal affect, consistent with stated mood, no evidence of hallucinations     Additional exam findings: We discussed the expected course, resolution and complications of the diagnosis(es) in detail. Medication risks, benefits, costs, interactions, and alternatives were discussed as indicated. I advised him to contact the office if his condition worsens, changes or fails to improve as anticipated. He expressed understanding with the diagnosis(es) and plan. Dexter Keenan, who was evaluated through a patient-initiated, synchronous (real-time) audio-video encounter, and/or his healthcare decision maker, is aware that it is a billable service, with coverage as determined by his insurance carrier. He provided verbal consent to proceed: Yes, and patient identification was verified. It was conducted pursuant to the emergency declaration under the [address] [place] [address] authority and the [employer] General Act. A caregiver was present when appropriate. Ability to conduct physical exam was limited. I was in the office. The patient was at home.       Shelly Mack NP

## 2021-03-16 NOTE — PATIENT INSTRUCTIONS
Medicare Wellness Visit, Male The best way to live healthy is to have a lifestyle where you eat a well-balanced diet, exercise regularly, limit alcohol use, and quit all forms of tobacco/nicotine, if applicable. Regular preventive services are another way to keep healthy. Preventive services (vaccines, screening tests, monitoring & exams) can help personalize your care plan, which helps you manage your own care. Screening tests can find health problems at the earliest stages, when they are easiest to treat. [facility] follows the current, evidence-based guidelines published by the [facility] ([facility]) when recommending preventive services for our patients. Because we follow these guidelines, sometimes recommendations change over time as research supports it. (For example, a prostate screening blood test is no longer routinely recommended for men with no symptoms). Of course, you and your doctor may decide to screen more often for some diseases, based on your risk and co-morbidities (chronic disease you are already diagnosed with). Preventive services for you include: - Medicare offers their members a free annual wellness visit, which is time for you and your primary care provider to discuss and plan for your preventive service needs. Take advantage of this benefit every year! 
-All adults over age 72 should receive the recommended pneumonia vaccines. Current USPSTF guidelines recommend a series of two vaccines for the best pneumonia protection.  
-All adults should have a flu vaccine yearly and tetanus vaccine every 10 years. 
-All adults age 48 and older should receive the shingles vaccines (series of two vaccines).       
-All adults age 38-68 who are overweight should have a diabetes screening test once every three years.  
-Other screening tests & preventive services for persons with diabetes include: an eye exam to screen for diabetic retinopathy, a kidney function test, a foot exam, and stricter control over your cholesterol.  
-Cardiovascular screening for adults with routine risk involves an electrocardiogram (ECG) at intervals determined by the provider.  
-Colorectal cancer screening should be done for adults age 54-65 with no increased risk factors for colorectal cancer. There are a number of acceptable methods of screening for this type of cancer. Each test has its own benefits and drawbacks. Discuss with your provider what is most appropriate for you during your annual wellness visit. The different tests include: colonoscopy (considered the best screening method), a fecal occult blood test, a fecal DNA test, and sigmoidoscopy. 
-All adults born between [facility] should be screened once for Hepatitis C. 
-An Abdominal Aortic Aneurysm (AAA) Screening is recommended for men age 73-68 who has ever smoked in their lifetime. Here is a list of your current Health Maintenance items (your personalized list of preventive services) with a due date: 
Health Maintenance Due Topic Date Due  
 Hepatitis C Test  Never done  COVID-19 Vaccine (1) Never done

## 2021-03-16 NOTE — PROGRESS NOTES
Chief Complaint   Patient presents with   Darya Dugan Annual Wellness Visit     1. Have you been to the ER, urgent care clinic since your last visit? Hospitalized since your last visit? No    2. Have you seen or consulted any other health care providers outside of the [address] [place] [address] since your last visit? Include any pap smears or colon screening. No  This is the Subsequent Medicare Annual Wellness Exam, performed 12 months or more after the Initial AWV or the last Subsequent AWV    I have reviewed the patient's medical history in detail and updated the computerized patient record. Depression Risk Factor Screening:     3 most recent PHQ Screens 3/16/2021   PHQ Not Done -   Little interest or pleasure in doing things Not at all   Feeling down, depressed, irritable, or hopeless Not at all   Total Score PHQ 2 0   Trouble falling or staying asleep, or sleeping too much Not at all   Feeling tired or having little energy Not at all   Poor appetite, weight loss, or overeating Not at all   Feeling bad about yourself - or that you are a failure or have let yourself or your family down Not at all   Trouble concentrating on things such as school, work, reading, or watching TV Several days   Moving or speaking so slowly that other people could have noticed; or the opposite being so fidgety that others notice Not at all   Thoughts of being better off dead, or hurting yourself in some way Not at all   PHQ 9 Score 1   How difficult have these problems made it for you to do your work, take care of your home and get along with others Not difficult at all       Alcohol Risk Screen    Do you average more than 2 drinks per night or 14 drinks a week: No    On any one occasion in the past three months have you have had more than 4 drinks containing alcohol:  No        Functional Ability and Level of Safety:    Hearing: Hearing is good. Activities of Daily Living: The home contains: no safety equipment.   Patient needs help with:  transportation, shopping, preparing meals, laundry, housework, managing medications, managing money, dressing, bathing, bathroom needs and walking      Ambulation: with no difficulty     Fall Risk:  Fall Risk Assessment, last 12 mths 3/16/2021   Able to walk? Yes   Fall in past 12 months? 0   Do you feel unsteady? 1   Are you worried about falling 1      Abuse Screen:  Patient is not abused       Cognitive Screening    Has your family/caregiver stated any concerns about your memory: no    Cognitive Screening: Normal - MMSE (Mini Mental Status Exam)  - Patient is unable to perform due to his mental state    Assessment/Plan   Education and counseling provided:  Are appropriate based on today's review and evaluation  Prostate cancer screening tests (PSA, covered annually)  Colorectal cancer screening tests    Diagnoses and all orders for this visit:    1. Medicare annual wellness visit, subsequent    2. Screening for depression    3. ACP (advance care planning)      Health Maintenance Due     Health Maintenance Due   Topic Date Due    Hepatitis C Screening  Never done    COVID-19 Vaccine (1) Never done       Patient Care Team   Patient Care Team:  Maria Esther Herron NP as PCP - General (Nurse Practitioner)  Dipika Hall NP as PCP - 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  use thin layer to small areas 45 g 0    guaiFENesin-dextromethorphan (TUSSI-ORGANIDIN DM)  mg/5 mL liqd Take 5 mL by mouth three (3) times daily as needed for Cough or Congestion. 473 mL 0    cetirizine (ZYRTEC) 10 mg tablet Take 1 Tab by mouth daily. 30 Tab 11    montelukast (SINGULAIR) 10 mg tablet Take 1 Tab by mouth daily. 30 Tab 9     Allergies   Allergen Reactions    Amoxicillin Hives    Codeine Seizures    Tramadol Seizures     Seizures       Family History   Problem Relation Age of Onset    Schizophrenia Mother     No Known Problems Father     No Known Problems Sister     No Known Problems Sister      Social History     Tobacco Use    Smoking status: Never Smoker    Smokeless tobacco: Never Used   Substance Use Topics    Alcohol use: No       Sandeep Dryer, who was evaluated through a synchronous (real-time) audio-video encounter, and/or his healthcare decision maker, is aware that it is a billable service, with coverage as determined by his insurance carrier. He provided verbal consent to proceed: Yes, and patient identification was verified. It was conducted pursuant to the emergency declaration under the [address] [place] [address], [address] [place] [address] authority and the [employer] General Act. A caregiver was present when appropriate. Ability to conduct physical exam was limited. I was in the office. The patient was at home.     Shareen Hodgkin, NP

## 2021-04-20 ENCOUNTER — VIRTUAL VISIT (OUTPATIENT)
Dept: FAMILY MEDICINE CLINIC | Age: 46
End: 2021-04-20
Payer: MEDICARE

## 2021-04-20 DIAGNOSIS — R05.3 CHRONIC COUGH: ICD-10-CM

## 2021-04-20 DIAGNOSIS — J31.0 CHRONIC RHINITIS: ICD-10-CM

## 2021-04-20 DIAGNOSIS — Z76.0 MEDICATION REFILL: ICD-10-CM

## 2021-04-20 PROCEDURE — 99213 OFFICE O/P EST LOW 20 MIN: CPT | Performed by: NURSE PRACTITIONER

## 2021-04-20 PROCEDURE — G8427 DOCREV CUR MEDS BY ELIG CLIN: HCPCS | Performed by: NURSE PRACTITIONER

## 2021-04-20 PROCEDURE — G8432 DEP SCR NOT DOC, RNG: HCPCS | Performed by: NURSE PRACTITIONER

## 2021-04-20 RX ORDER — PREDNISONE 20 MG/1
TABLET ORAL
Qty: 18 TAB | Refills: 0 | Status: SHIPPED | OUTPATIENT
Start: 2021-04-20 | End: 2021-06-23 | Stop reason: ALTCHOICE

## 2021-04-20 RX ORDER — MONTELUKAST SODIUM 10 MG/1
10 TABLET ORAL DAILY
Qty: 30 TAB | Refills: 9 | Status: SHIPPED | OUTPATIENT
Start: 2021-04-20 | End: 2022-05-24

## 2021-04-20 NOTE — PROGRESS NOTES
Parvin Soler is a 55 y.o. male who was seen by synchronous (real-time) audio-video technology on 4/20/2021 for Cough        Assessment & Plan:   Diagnoses and all orders for this visit:    1. Chronic cough  -     REFERRAL TO PULMONARY DISEASE  -     predniSONE (DELTASONE) 20 mg tablet; Take 3 tablets daily by mouth for 3 days, then take 2 tablets daily by mouth for 3 days, then take 1 tablet daily by mouth for 3 days. -     XR CHEST COMP 4 V; Future    2. Chronic rhinitis  -     montelukast (SINGULAIR) 10 mg tablet; Take 1 Tab by mouth daily. 3. Medication refill  -     montelukast (SINGULAIR) 10 mg tablet; Take 1 Tab by mouth daily. I spent at least 20 minutes on this visit with this established patient. [id number]  Subjective:   Patient mother is on appointment today to help patient her name is Keyonna Frye. Chronic cough  Patient have a history of chronic cough. He have been ordered zyrtec, singular and guaifenesin-dextromethorphan and patient mother still states nothing works for him but guaifenesin-codeine. Patient have been referred to pulmonology in the past (8/2020) but did not go to appointment. Today explained to mother that he needs a further workup for his coughing. Offered a inhaler but mother declined stating he will not use it. Informed Ms Michael Begum I will order a prednisone taper for patient and a chest xray. In the meantime I will ref patient back to pulmonology. Ms. Michael Begum stated they tried to schedule an appointment for pulmonology and they stated they would not see him last year during the summer because he had a cough. Mother declined using any other type of cough medication that did not have codeine in it. Prior to Admission medications    Medication Sig Start Date End Date Taking? Authorizing Provider   montelukast (SINGULAIR) 10 mg tablet Take 1 Tab by mouth daily.  4/20/21  Yes Abigail Herron NP   predniSONE (DELTASONE) 20 mg tablet Take 3 tablets daily by mouth for 3 days, then take 2 tablets daily by mouth for 3 days, then take 1 tablet daily by mouth for 3 days. 4/20/21  Yes Abigail Herron NP   cetirizine (ZYRTEC) 10 mg tablet Take 1 Tab by mouth daily. 2/5/21  Yes Abigail Herron NP   triamcinolone acetonide (KENALOG) 0.1 % topical cream Apply  to affected area two (2) times daily as needed for Skin Irritation or Itching. use thin layer to small areas 3/16/21   Abigail Herron NP   guaiFENesin-dextromethorphan (TUSSI-ORGANIDIN DM)  mg/5 mL liqd Take 5 mL by mouth three (3) times daily as needed for Cough or Congestion. 3/16/21   Abigail Herron NP   montelukast (SINGULAIR) 10 mg tablet Take 1 Tab by mouth daily. 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 Eyes: Negative. Respiratory: Positive for cough. Negative for shortness of breath and wheezing. Cardiovascular: Negative for chest pain, palpitations and leg swelling. Musculoskeletal: Negative. Skin: Negative. Neurological: Negative.         Objective:     Patient-Reported Vitals 4/20/2021   Patient-Reported Weight 176      General: alert, cooperative, no distress   Mental  status: normal mood, behavior, speech, dress, motor activity, and thought processes, able to follow commands   HENT: NCAT   Neck: no visualized mass   Resp: no respiratory distress   Neuro: no gross deficits   Skin: no discoloration or lesions of concern on visible areas   Psychiatric: normal affect, consistent with stated mood, no evidence of hallucinations     Additional exam findings: We discussed the expected course, resolution and complications of the diagnosis(es) in detail. Medication risks, benefits, costs, interactions, and alternatives were discussed as indicated. I advised him to contact the office if his condition worsens, changes or fails to improve as anticipated. He expressed understanding with the diagnosis(es) and plan. Shiloh Ohara, who was evaluated through a patient-initiated, synchronous (real-time) audio-video encounter, and/or his healthcare decision maker, is aware that it is a billable service, with coverage as determined by his insurance carrier. He provided verbal consent to proceed: Yes, and patient identification was verified. It was conducted pursuant to the emergency declaration under the [address] [place] [address], [address] [place] [address] authority and the [employer] General Act. A caregiver was present when appropriate. Ability to conduct physical exam was limited. I was in the office. The patient was at home.       Juma Chopra NP

## 2021-04-20 NOTE — PROGRESS NOTES
Lizeth Del Castillo presents today for   Chief Complaint   Patient presents with    Cough       Is someone accompanying this pt? no    Is the patient using any DME equipment during OV? no    Depression Screening:  3 most recent PHQ Screens 4/20/2021   PHQ Not Done -   Little interest or pleasure in doing things Not at all   Feeling down, depressed, irritable, or hopeless Not at all   Total Score PHQ 2 0   Trouble falling or staying asleep, or sleeping too much Not at all   Feeling tired or having little energy Not at all   Poor appetite, weight loss, or overeating Not at all   Feeling bad about yourself - or that you are a failure or have let yourself or your family down Not at all   Trouble concentrating on things such as school, work, reading, or watching TV Not at all   Moving or speaking so slowly that other people could have noticed; or the opposite being so fidgety that others notice Not at all   Thoughts of being better off dead, or hurting yourself in some way Not at all   PHQ 9 Score 0   How difficult have these problems made it for you to do your work, take care of your home and get along with others Not difficult at all       Learning Assessment:  No flowsheet data found. Abuse Screening:  Abuse Screening Questionnaire 3/16/2021   Do you ever feel afraid of your partner? N   Are you in a relationship with someone who physically or mentally threatens you? N   Is it safe for you to go home? Y       Fall Risk  Fall Risk Assessment, last 12 mths 3/16/2021   Able to walk? Yes   Fall in past 12 months? 0   Do you feel unsteady? 1   Are you worried about falling 1       Health Maintenance reviewed and discussed and ordered per Provider. Health Maintenance Due   Topic Date Due    Hepatitis C Screening  Never done    COVID-19 Vaccine (1) Never done   . Coordination of Care:  1. Have you been to the ER, urgent care clinic since your last visit? Hospitalized since your last visit? no    2.  Have you seen or consulted any other health care providers outside of the [address] [place] [address] since your last visit? Include any pap smears or colon screening.  no

## 2021-05-04 DIAGNOSIS — J30.1 ALLERGIC RHINITIS DUE TO POLLEN, UNSPECIFIED SEASONALITY: ICD-10-CM

## 2021-05-04 DIAGNOSIS — Z76.0 MEDICATION REFILL: ICD-10-CM

## 2021-05-04 DIAGNOSIS — R05.9 COUGH: ICD-10-CM

## 2021-05-04 DIAGNOSIS — J31.0 CHRONIC RHINITIS: ICD-10-CM

## 2021-05-04 RX ORDER — CETIRIZINE HCL 10 MG
10 TABLET ORAL DAILY
Qty: 30 TAB | Refills: 11 | OUTPATIENT
Start: 2021-05-04

## 2021-05-04 RX ORDER — MONTELUKAST SODIUM 10 MG/1
10 TABLET ORAL DAILY
Qty: 30 TAB | Refills: 9 | OUTPATIENT
Start: 2021-05-04

## 2021-05-04 NOTE — TELEPHONE ENCOUNTER
Requested Prescriptions     Pending Prescriptions Disp Refills    cetirizine (ZYRTEC) 10 mg tablet 30 Tab 11     Sig: Take 1 Tab by mouth daily.  montelukast (SINGULAIR) 10 mg tablet 30 Tab 9     Sig: Take 1 Tab by mouth daily.

## 2021-05-04 NOTE — TELEPHONE ENCOUNTER
Patient should have refills available on both prescriptions-left message for the mother to contact the pharmacy

## 2021-06-23 ENCOUNTER — OFFICE VISIT (OUTPATIENT)
Dept: FAMILY MEDICINE CLINIC | Age: 46
End: 2021-06-23
Payer: MEDICAID

## 2021-06-23 VITALS
HEART RATE: 64 BPM | HEIGHT: 66 IN | WEIGHT: 162 LBS | SYSTOLIC BLOOD PRESSURE: 113 MMHG | OXYGEN SATURATION: 97 % | TEMPERATURE: 99.4 F | RESPIRATION RATE: 16 BRPM | DIASTOLIC BLOOD PRESSURE: 66 MMHG | BODY MASS INDEX: 26.03 KG/M2

## 2021-06-23 DIAGNOSIS — Z76.0 MEDICATION REFILL: ICD-10-CM

## 2021-06-23 DIAGNOSIS — J30.1 ALLERGIC RHINITIS DUE TO POLLEN, UNSPECIFIED SEASONALITY: ICD-10-CM

## 2021-06-23 DIAGNOSIS — R05.9 COUGH: ICD-10-CM

## 2021-06-23 DIAGNOSIS — R21 RASH AND NONSPECIFIC SKIN ERUPTION: ICD-10-CM

## 2021-06-23 DIAGNOSIS — J31.0 CHRONIC RHINITIS: ICD-10-CM

## 2021-06-23 PROCEDURE — 99213 OFFICE O/P EST LOW 20 MIN: CPT | Performed by: NURSE PRACTITIONER

## 2021-06-23 RX ORDER — MONTELUKAST SODIUM 10 MG/1
10 TABLET ORAL DAILY
Qty: 30 TABLET | Refills: 9 | Status: CANCELLED | OUTPATIENT
Start: 2021-06-23

## 2021-06-23 RX ORDER — LEVOCETIRIZINE DIHYDROCHLORIDE 5 MG/1
TABLET, FILM COATED ORAL
Qty: 90 TABLET | Refills: 1 | Status: SHIPPED | OUTPATIENT
Start: 2021-06-23 | End: 2022-04-07 | Stop reason: ALTCHOICE

## 2021-06-23 RX ORDER — TRIAMCINOLONE ACETONIDE 1 MG/G
CREAM TOPICAL
Qty: 45 G | Refills: 0 | Status: SHIPPED | OUTPATIENT
Start: 2021-06-23 | End: 2022-02-20

## 2021-06-23 RX ORDER — CETIRIZINE HCL 10 MG
10 TABLET ORAL DAILY
Qty: 30 TABLET | Refills: 11 | Status: CANCELLED | OUTPATIENT
Start: 2021-06-23

## 2021-06-23 NOTE — PROGRESS NOTES
Salvador Medina presents today for   Chief Complaint   Patient presents with    Follow-up     chronic cough and allergies       Is someone accompanying this pt? no    Is the patient using any DME equipment during OV? no    Depression Screening:  3 most recent PHQ Screens 6/23/2021   PHQ Not Done -   Little interest or pleasure in doing things Not at all   Feeling down, depressed, irritable, or hopeless Not at all   Total Score PHQ 2 0   Trouble falling or staying asleep, or sleeping too much Not at all   Feeling tired or having little energy Not at all   Poor appetite, weight loss, or overeating Not at all   Feeling bad about yourself - or that you are a failure or have let yourself or your family down Not at all   Trouble concentrating on things such as school, work, reading, or watching TV Not at all   Moving or speaking so slowly that other people could have noticed; or the opposite being so fidgety that others notice Not at all   Thoughts of being better off dead, or hurting yourself in some way Not at all   PHQ 9 Score 0   How difficult have these problems made it for you to do your work, take care of your home and get along with others Not difficult at all       Learning Assessment:  No flowsheet data found. Abuse Screening:  Abuse Screening Questionnaire 3/16/2021   Do you ever feel afraid of your partner? N   Are you in a relationship with someone who physically or mentally threatens you? N   Is it safe for you to go home? Y       Fall Risk  Fall Risk Assessment, last 12 mths 3/16/2021   Able to walk? Yes   Fall in past 12 months? 0   Do you feel unsteady? 1   Are you worried about falling 1       Health Maintenance reviewed and discussed and ordered per Provider. Health Maintenance Due   Topic Date Due    Hepatitis C Screening  Never done    COVID-19 Vaccine (1) Never done   . Coordination of Care:  1. Have you been to the ER, urgent care clinic since your last visit?  Hospitalized since your last visit? no    2. Have you seen or consulted any other health care providers outside of the [address] [place] [address] since your last visit? Include any pap smears or colon screening.  no      Last  Checked no  Last UDS Checked no  Last Pain contract signed: no

## 2021-06-23 NOTE — PROGRESS NOTES
OFFICE NOTE    Gaston Bernabe is a 55 y.o. male presenting today for the following:  Chief Complaint   Patient presents with    Follow-up     chronic cough and allergies        HPI     Cough and allergies  Patient have a chronic cough and was previously referred to pulmonary and ordered chest xray on last visit. Ordered chest xray due to chronic cough. Patient has been referred to pulmonary at least twice in the past but has not went to appointments. Patient has been on prednisone, and singular, guaifenesin-dextromethorphan and guaifenesin-codeine. Patient mother states he have an upcoming pulmonary appointment to discuss patient chronic cough. She states patient will not use a inhaler. Patient is having chest xray completed today while in the office. Changed antihistamine from zyrtec since taking over 2 years per mother to xyzal.  Advised to continue using a humidifier at night to help with the night time cough. Patient denies chest pain or SOB. Review of Systems   Constitutional: Negative for fatigue and fever. HENT: Negative. Eyes: Negative. Respiratory: Positive for cough (intermittently just at night). Negative for chest tightness, shortness of breath and wheezing. Cardiovascular: Negative for chest pain and palpitations. Neurological: Negative for dizziness, light-headedness and headaches.          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       Social History     Socioeconomic History    Marital status: UNKNOWN     Spouse name: Not on file    Number of children: Not on file    Years of education: Not on file    Highest education level: Not on file   Occupational History    Not on file   Tobacco Use    Smoking status: Never Smoker    Smokeless tobacco: Never Used Vaping Use    Vaping Use: Never used   Substance and Sexual Activity    Alcohol use: No    Drug use: No    Sexual activity: Never   Other Topics Concern    Not on file   Social History Narrative    Not on file     Social Determinants of Health     Financial Resource Strain:     Difficulty of Paying Living Expenses:    Food Insecurity:     Worried About Running Out of Food in the Last Year:     [address] in the Last Year:    Transportation Needs:     Lack of Transportation (Medical):  Lack of Transportation (Non-Medical):    Physical Activity:     Days of Exercise per Week:     Minutes of Exercise per Session:    Stress:     Feeling of Stress :    Social Connections:     Frequency of Communication with Friends and Family:     Frequency of Social Gatherings with Friends and Family:     Attends Orthodox Services:     Active Member of Clubs or Organizations:     Attends Club or Organization Meetings:     Marital Status:    Intimate Partner Violence:     Fear of Current or Ex-Partner:     Emotionally Abused:     Physically Abused:     Sexually Abused: Allergies   Allergen Reactions    Amoxicillin Hives    Codeine Seizures    Tramadol Seizures     Seizures       Current Outpatient Medications   Medication Sig Dispense Refill    triamcinolone acetonide (KENALOG) 0.1 % topical cream Apply  to affected area two (2) times daily as needed for Skin Irritation or Itching. use thin layer to small areas 45 g 0    levocetirizine (XYZAL) 5 mg tablet Take one tablet by mouth nightly 90 Tablet 1    montelukast (SINGULAIR) 10 mg tablet Take 1 Tab by mouth daily. 30 Tab 9    guaiFENesin-dextromethorphan (TUSSI-ORGANIDIN DM)  mg/5 mL liqd Take 5 mL by mouth three (3) times daily as needed for Cough or Congestion.  473 mL 0         Patient Care Team:  Patient Care Team:  Gabby Arrieta NP as PCP - General (Nurse Practitioner)  Gabby Arrieta NP as PCP - [facility] Empaneled Provider      LABS:  No results found for any visits on 06/23/21. RADIOLOGY:  No recent results      Physical Exam  Vitals and nursing note reviewed. Constitutional:       Appearance: Normal appearance. He is well-developed. HENT:      Right Ear: External ear normal.      Left Ear: External ear normal.   Cardiovascular:      Rate and Rhythm: Normal rate and regular rhythm. Heart sounds: Normal heart sounds. Pulmonary:      Effort: Pulmonary effort is normal. No respiratory distress. Breath sounds: Normal breath sounds. No wheezing or rales. Abdominal:      General: There is no distension. Tenderness: There is no abdominal tenderness. There is no rebound. Musculoskeletal:         General: Normal range of motion. Cervical back: Normal range of motion and neck supple. Lymphadenopathy:      Cervical: No cervical adenopathy. Skin:     General: Skin is warm and dry. Neurological:      Mental Status: He is alert. Mental status is at baseline. Deep Tendon Reflexes: Reflexes are normal and symmetric. Vitals:    06/23/21 1106   BP: 113/66   Pulse: 64   Resp: 16   Temp: 99.4 °F (37.4 °C)   TempSrc: Temporal   SpO2: 97%   Weight: 162 lb (73.5 kg)   Height: 5' 6\" (1.676 m)   PainSc:   0 - No pain         Assessment and Plan    1. Rash and nonspecific skin eruption  Medication refill  - triamcinolone acetonide (KENALOG) 0.1 % topical cream; Apply  to affected area two (2) times daily as needed for Skin Irritation or Itching. use thin layer to small areas  Dispense: 45 g; Refill: 0    2. Chronic rhinitis    - levocetirizine (XYZAL) 5 mg tablet; Take one tablet by mouth nightly  Dispense: 90 Tablet; Refill: 1    3. Medication refill    - triamcinolone acetonide (KENALOG) 0.1 % topical cream; Apply  to affected area two (2) times daily as needed for Skin Irritation or Itching. use thin layer to small areas  Dispense: 45 g; Refill: 0    4.  Allergic rhinitis due to pollen, unspecified seasonality    - levocetirizine (XYZAL) 5 mg tablet; Take one tablet by mouth nightly  Dispense: 90 Tablet; Refill: 1    5. Cough    - levocetirizine (XYZAL) 5 mg tablet; Take one tablet by mouth nightly  Dispense: 90 Tablet; Refill: 1      MDM    Procedures      *Plan of care reviewed with patient. Patient in agreement with plan and expresses understanding. All questions answered and patient encouraged to call or RTO if further questions or concerns. Advised patient if symptoms worsen to go to nearest ER or call 911. AVS and recommendations given to patient upon discharge.

## 2021-06-23 NOTE — PATIENT INSTRUCTIONS
Cough: Care Instructions  Your Care Instructions     A cough is your body's response to something that bothers your throat or airways. Many things can cause a cough. You might cough because of a cold or the flu, bronchitis, or asthma. Smoking, postnasal drip, allergies, and stomach acid that backs up into your throat also can cause coughs. A cough is a symptom, not a disease. Most coughs stop when the cause, such as a cold, goes away. You can take a few steps at home to cough less and feel better. Follow-up care is a key part of your treatment and safety. Be sure to make and go to all appointments, and call your doctor if you are having problems. It's also a good idea to know your test results and keep a list of the medicines you take. How can you care for yourself at home? · Drink lots of water and other fluids. This helps thin the mucus and soothes a dry or sore throat. Honey or lemon juice in hot water or tea may ease a dry cough. · Take cough medicine as directed by your doctor. · Prop up your head on pillows to help you breathe and ease a dry cough. · Try cough drops to soothe a dry or sore throat. Cough drops don't stop a cough. Medicine-flavored cough drops are no better than candy-flavored drops or hard candy. · Do not smoke. Avoid secondhand smoke. If you need help quitting, talk to your doctor about stop-smoking programs and medicines. These can increase your chances of quitting for good. When should you call for help? Call 911 anytime you think you may need emergency care. For example, call if:    · You have severe trouble breathing. Call your doctor now or seek immediate medical care if:    · You cough up blood.     · You have new or worse trouble breathing.     · You have a new or higher fever.     · You have a new rash.    Watch closely for changes in your health, and be sure to contact your doctor if:    · You cough more deeply or more often, especially if you notice more mucus or a change in the color of your mucus.     · You have new symptoms, such as a sore throat, an earache, or sinus pain.     · You do not get better as expected. Where can you learn more? Go to http://www.gray.com/  Enter D279 in the search box to learn more about \"Cough: Care Instructions. \"  Current as of: October 26, 2020               Content Version: 12.8  © 2006-2021 Healthwise, [facility]. Care instructions adapted under license by [employer] (which disclaims liability or warranty for this information). If you have questions about a medical condition or this instruction, always ask your healthcare professional. [place] [address] any warranty or liability for your use of this information.     [address] [place] [id number]  Phone: [phone]  Fax: [phone]

## 2021-08-10 ENCOUNTER — TELEPHONE (OUTPATIENT)
Dept: FAMILY MEDICINE CLINIC | Age: 46
End: 2021-08-10

## 2021-08-10 NOTE — TELEPHONE ENCOUNTER
Attempted to contact caller and no answer-left message for her to go to ER if the cough was too bad or follow up at scheduled appt with Pulmonology if they can wait 2 days.

## 2021-08-10 NOTE — TELEPHONE ENCOUNTER
Patient's mother called to speak to nurse regarding his cough. She wants to know if she should take him to the ER.

## 2021-08-12 ENCOUNTER — OFFICE VISIT (OUTPATIENT)
Dept: PULMONOLOGY | Age: 46
End: 2021-08-12
Payer: MEDICAID

## 2021-08-12 VITALS
SYSTOLIC BLOOD PRESSURE: 119 MMHG | BODY MASS INDEX: 26.16 KG/M2 | WEIGHT: 162.8 LBS | HEIGHT: 66 IN | OXYGEN SATURATION: 97 % | TEMPERATURE: 98.4 F | RESPIRATION RATE: 16 BRPM | HEART RATE: 66 BPM | DIASTOLIC BLOOD PRESSURE: 68 MMHG

## 2021-08-12 DIAGNOSIS — R05.3 CHRONIC COUGH: Primary | ICD-10-CM

## 2021-08-12 DIAGNOSIS — Q90.9 DOWN SYNDROME: ICD-10-CM

## 2021-08-12 PROCEDURE — 99204 OFFICE O/P NEW MOD 45 MIN: CPT | Performed by: INTERNAL MEDICINE

## 2021-08-12 RX ORDER — DEXTROMETHORPHAN POLISTIREX 30 MG/5ML
60 SUSPENSION ORAL 2 TIMES DAILY
COMMUNITY
End: 2022-04-22

## 2021-08-12 RX ORDER — FLUTICASONE PROPIONATE 50 MCG
2 SPRAY, SUSPENSION (ML) NASAL DAILY
Qty: 1 BOTTLE | Refills: 3 | Status: SHIPPED | OUTPATIENT
Start: 2021-08-12 | End: 2021-08-12 | Stop reason: SDUPTHER

## 2021-08-12 NOTE — PROGRESS NOTES
HISTORY OF PRESENT ILLNESS  Tom Alexander is a 55 y.o. male referred for chronic cough. Pt with Down's syndrome who has complained of a chronic cough since . History is obtained from pt's mother as pt is essentially nonverbal. Pt has a history of seizures and environmental allergies and is on Montelukast. He was prescribed an unrecalled inhaler but refused to use it per PCP notes. Cough was more evident at night or when supine. Pt's mother also noted increased cough when pt is eating or drinking. No chest pain or fever. Pt was diagnosed with COVID 19 in Feb 2021 after presenting with cough and fever. Cough predated COVID but worsened during COVID and came back to baseline after pt recovered. CXR then showed bilateral patchy infiltrates consistent with COVID pneumonia, follow up CXR in June 2021 noted resolution of infiltrates, see below. CXR reviewed personally. 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  levocetirizine (XYZAL) 5 mg tablet Take one tablet by mouth nightly 90 Tablet 1    montelukast (SINGULAIR) 10 mg tablet Take 1 Tab by mouth daily. 30 Tab 9    triamcinolone acetonide (KENALOG) 0.1 % topical cream Apply  to affected area two (2) times daily as needed for Skin Irritation or Itching. use thin layer to small areas (Patient not taking: Reported on 8/12/2021) 45 g 0    guaiFENesin-dextromethorphan (TUSSI-ORGANIDIN DM)  mg/5 mL liqd Take 5 mL by mouth three (3) times daily as needed for Cough or Congestion.  (Patient not taking: Reported on 8/12/2021) 473 mL 0     No current facility-administered medications on file prior to visit. Allergies   Allergen Reactions    Amoxicillin Hives    Codeine Seizures    Tramadol Seizures     Seizures     Family History   Problem Relation Age of Onset    Schizophrenia Mother     No Known Problems Father     No Known Problems Sister     No Known Problems Sister      Social History     Socioeconomic History    Marital status: UNKNOWN     Spouse name: Not on file    Number of children: Not on file    Years of education: Not on file    Highest education level: Not on file   Occupational History    Not on file   Tobacco Use    Smoking status: Never Smoker    Smokeless tobacco: Never Used   Vaping Use    Vaping Use: Never used   Substance and Sexual Activity    Alcohol use: No    Drug use: No    Sexual activity: Never   Other Topics Concern    Not on file   Social History Narrative    Not on file     Social Determinants of Health     Financial Resource Strain:     Difficulty of Paying Living Expenses:    Food Insecurity:     Worried About Running Out of Food in the Last Year:     Ran Out of Food in the Last Year:    Transportation Needs:     Lack of Transportation (Medical):  Lack of Transportation (Non-Medical):    Physical Activity:     Days of Exercise per Week:     Minutes of Exercise per Session:    Stress:     Feeling of Stress :    Social Connections:     Frequency of Communication with Friends and Family:     Frequency of Social Gatherings with Friends and Family:     Attends Anabaptism Services:     Active Member of Clubs or Organizations:     Attends Club or Organization Meetings:     Marital Status:    Intimate Partner Violence:     Fear of Current or Ex-Partner:     Emotionally Abused:     Physically Abused:     Sexually Abused:      Blood pressure 119/68, pulse 66, temperature 98.4 °F (36.9 °C), temperature source Oral, resp.  rate 16, height 5' 6\" (1.676 m), weight 73.8 kg (162 lb 12.8 oz), SpO2 97 %. Physical Exam  Constitutional:       General: He is not in acute distress. Appearance: He is not ill-appearing, toxic-appearing or diaphoretic. HENT:      Head: Normocephalic and atraumatic. Right Ear: External ear normal.      Left Ear: External ear normal.      Nose:      Comments: Deferred      Mouth/Throat:      Comments: Deferred   Eyes:      General: No scleral icterus. Left eye: No discharge. Comments: R eye enucleation   Neck:      Vascular: No carotid bruit. Cardiovascular:      Rate and Rhythm: Normal rate and regular rhythm. Pulses: Normal pulses. Heart sounds: Normal heart sounds. No murmur heard. No gallop. Pulmonary:      Effort: No respiratory distress. Breath sounds: Normal breath sounds. No stridor. No wheezing, rhonchi or rales. Chest:      Chest wall: No tenderness. Abdominal:      Palpations: Abdomen is soft. There is no mass. Tenderness: There is no abdominal tenderness. Musculoskeletal:         General: No swelling, tenderness, deformity or signs of injury. Cervical back: No rigidity or tenderness. Right lower leg: No edema. Left lower leg: No edema. Lymphadenopathy:      Cervical: No cervical adenopathy. Skin:     General: Skin is warm and dry. Coloration: Skin is not jaundiced or pale. Findings: No bruising, erythema or rash. Lesion: sparse hyperpigmented macules on UE. Neurological:      General: No focal deficit present. Mental Status: He is alert and oriented to person, place, and time. Cranial Nerves: No cranial nerve deficit. Sensory: No sensory deficit. Motor: No weakness.       Coordination: Coordination normal.      Gait: Gait normal.      Deep Tendon Reflexes: Reflexes normal.   Psychiatric:      Comments: Limited exam, pt follows all commands       Impression      Multifocal pneumonic infiltrates in keeping with suspected history of Covid pneumonia. Signed By: Antony Tirado MD on 2/8/2021 5:38 PM  Narrative    EXAM: One view chest x-ray     CLINICAL INDICATION/HISTORY: Cough. COMPARISON: 11/4/2020. TECHNIQUE: Single AP view of the chest was obtained.     _______________     FINDINGS:     HEART, VESSELS, MEDIASTINUM: Heart size is normal. No vascular congestion. LUNGS, PLEURAL SPACES: Hazy parenchymal opacities involving the right upper and bilateral lower lungs. No effusion or pneumothorax. BONY THORAX, SOFT TISSUES: Unremarkable.     _______________  Other Result Text    Interface, [facility] Rad [facility] - 02/08/2021  5:40 PM EST   EXAM: One view chest x-ray     CLINICAL INDICATION/HISTORY: Cough. COMPARISON: 11/4/2020. TECHNIQUE: Single AP view of the chest was obtained.     _______________     FINDINGS:     HEART, VESSELS, MEDIASTINUM: Heart size is normal. No vascular congestion. LUNGS, PLEURAL SPACES: Hazy parenchymal opacities involving the right upper and bilateral lower lungs. No effusion or pneumothorax. BONY THORAX, SOFT TISSUES: Unremarkable.     _______________     IMPRESSION     Multifocal pneumonic infiltrates in keeping with suspected history of Covid pneumonia. Signed By: Antony Tirado MD on 2/8/2021 5:38 PM     Date: 02/08/21   Received From: [address]     Encounter Summary      XR Results (most recent):  Results from Appointment encounter on 06/23/21    XR CHEST PA LAT    Narrative  EXAM: XR CHEST PA LAT    INDICATION: 55 years Male. chronic cough. ADDITIONAL HISTORY: None. TECHNIQUE: Frontal and lateral views of the chest.    COMPARISON: 8/6/2020    FINDINGS:    The lateral view is limited by underpenetration imaging to the patient's arm  soft tissues. Mild hypoinflation. The cardiac silhouette is within normal limits in size. Pulmonary vasculature  appears within normal limits.  The fine calcification at the medial right upper  lung, likely calcified granuloma, unchanged. No confluent airspace opacity is appreciated. No evidence of pleural effusion or  pneumothorax. No acute osseous abnormality appreciated. Prominent bilateral  conoid tubercles again noted. Impression  No radiographic evidence of acute cardiopulmonary process. ASSESSMENT and PLAN  Encounter Diagnoses   Name Primary?  Chronic cough Yes    Down syndrome      Pt with chronic cough, by history likely due to Upper airway cough syndrome (UACS), however with Down syndrome and increased cough during meals will need to R/o dysphagia and aspiration. Modifies Barium swallow ordered. Pt to start Flonase spray, proper technique demonstrated. Also start Nasal saline rinses in the afternoon (flonase in am), pt also taught proper technique for this. Other differentials for chronic cough include bronchospasm and GERD however with history above, both seem much less likely. RTC 3 months or sooner prn. Further intervention pending above test and response to therapy.   Thank you for consulting us on this patient

## 2021-08-12 NOTE — PROGRESS NOTES
Naseem Evans presents today for   Chief Complaint   Patient presents with    Cough       Is someone accompanying this pt? Mother    Is the patient using any DME equipment during [address]? No    -DME Company NA    Depression Screening:  3 most recent PHQ Screens 6/23/2021   PHQ Not Done -   Little interest or pleasure in doing things Not at all   Feeling down, depressed, irritable, or hopeless Not at all   Total Score PHQ 2 0   Trouble falling or staying asleep, or sleeping too much Not at all   Feeling tired or having little energy Not at all   Poor appetite, weight loss, or overeating Not at all   Feeling bad about yourself - or that you are a failure or have let yourself or your family down Not at all   Trouble concentrating on things such as school, work, reading, or watching TV Not at all   Moving or speaking so slowly that other people could have noticed; or the opposite being so fidgety that others notice Not at all   Thoughts of being better off dead, or hurting yourself in some way Not at all   PHQ 9 Score 0   How difficult have these problems made it for you to do your work, take care of your home and get along with others Not difficult at all       Learning Assessment:  No flowsheet data found. Abuse Screening:  Abuse Screening Questionnaire 3/16/2021   Do you ever feel afraid of your partner? N   Are you in a relationship with someone who physically or mentally threatens you? N   Is it safe for you to go home? Y       Fall Risk  Fall Risk Assessment, last 12 mths 3/16/2021   Able to walk? Yes   Fall in past 12 months? 0   Do you feel unsteady? 1   Are you worried about falling 1         Coordination of Care:  1. Have you been to the ER, urgent care clinic since your last visit? Hospitalized since your last visit? No    2. Have you seen or consulted any other health care providers outside of the [address] [place] [address] since your last visit? Include any pap smears or colon screening. No

## 2021-08-12 NOTE — LETTER
8/12/2021    Patient: Peggy Medellin   YOB: 1975   Date of Visit: 8/12/2021     Damian Garcia NP  [address]  Via In Basket    Dear Damian Garcia NP,      Thank you for referring Mr. Pauline Walter to [address] [place] [address] for evaluation. My notes for this consultation are attached. If you have questions, please do not hesitate to call me. I look forward to following your patient along with you.       Sincerely,    Zheng Betancourt MD

## 2021-08-17 ENCOUNTER — TELEPHONE (OUTPATIENT)
Dept: FAMILY MEDICINE CLINIC | Age: 46
End: 2021-08-17

## 2021-08-17 ENCOUNTER — HOSPITAL ENCOUNTER (OUTPATIENT)
Dept: GENERAL RADIOLOGY | Age: 46
Discharge: HOME | End: 2021-08-17
Attending: INTERNAL MEDICINE
Payer: MEDICAID

## 2021-08-17 DIAGNOSIS — R05.3 CHRONIC COUGH: ICD-10-CM

## 2021-08-17 PROCEDURE — 92611 MOTION FLUOROSCOPY/SWALLOW: CPT

## 2021-08-17 PROCEDURE — 74011000250 HC RX REV CODE- 250: Performed by: INTERNAL MEDICINE

## 2021-08-17 PROCEDURE — 74230 X-RAY XM SWLNG FUNCJ C+: CPT

## 2021-08-17 RX ADMIN — BARIUM SULFATE 105 G: 960 POWDER, FOR SUSPENSION ORAL at 14:00

## 2021-08-17 RX ADMIN — BARIUM SULFATE 45 ML: 400 PASTE ORAL at 14:00

## 2021-08-17 RX ADMIN — BARIUM SULFATE 60 ML: 400 SUSPENSION ORAL at 14:00

## 2021-08-17 RX ADMIN — BARIUM SULFATE 700 MG: 700 TABLET ORAL at 14:00

## 2021-08-17 NOTE — TELEPHONE ENCOUNTER
Pt's mother was advised to call the PCP and request medication for a cough. Mr Mya Lopez was taking to [facility] for a throat swallowing test today and was told everything was good. Only that he just has a cough and recommended that the pcp is called for cough medication.  Please contact the mother for any questions or concerns on this matter

## 2021-08-17 NOTE — TELEPHONE ENCOUNTER
Spoke with patient's mother and she will use the Delsym for the cough.   Does not want referral for ENT

## 2021-08-17 NOTE — PROGRESS NOTES
[facility] [facility]  SPEECH LANGUAGE PATHOLOGY OUTPATIENT MODIFIED BARIUM SWALLOW STUDY    Patient: Esvin Monroy (67 y.o. male)  Date: 8/17/2021  Primary Diagnosis: Chronic cough [R05]  Precautions: Aspiration        Assessment:  Based on the objective data described below, the patient presents with mild oropharyngeal dysphagia. Pt demonstrating silent laryngeal penetration during the swallow with thin liquids by cup, resolved with use of straw on multiple attempts. Pt also able to tolerate NTL +/- straw, pudding, regular solids and 13 mm Ba pill with thin liquid wash + straw with no penetration or aspiration visualized. Deficits include decreased base of tongue strength with poor bolus control and mildly decreased laryngeal adduction/sensation. Pt demo positive pharyngeal strength/motility across intake. Recommend regular diet with thin liquids with use of straws for all liquid intake. Meds whole, one at a time + straw and aspiration precautions including upright for all intake and slow rate of intake. Results/recommendations discussed with pt and pt's mother. Pt's mother able to verbalize understanding. 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     Current Diet:  Regular/thin liquids    Radiology:  Film Views: Fluoro;Lateral  Patient Position: 90 in chair    Trial 1:   Consistency Presented:  Thin liquid;Pudding;Nectar thick liquid; Solid (13 mm Ba pill with thin liquid wash + straw)   How Presented: Self-fed/presented;Cup/sip;Straw;Successive swallows   Bolus Acceptance: No impairment   Bolus Formation/Control: No impairment:     Propulsion: No impairment   Oral Residue: None   Initiation of Swallow: No impairment   Timing: No impairment   Penetration: During swallow;From residual (With thin liquid by cup, resolved with straw)   Aspiration/Timing: No evidence of aspiration   Pharyngeal Clearance: No residue   Attempted Modifications: Straw   Effective Modifications: Straw   Cues for Modifications: Minimal     Decreased Tongue Base Retraction?: Yes  Laryngeal Elevation: Inadequate epiglottic inversion; Incomplete laryngeal closure;Minimal movement of larynx/epiglottis  Aspiration/Penetration Score: 3 (Penetration/Visible residue-Contrast remains above the folds/cords, but is not cleared) (Eventual penetration to cords)  Pharyngeal Symmetry: Not assessed  Pharyngeal-Esophageal Segment: No impairment  Pharyngeal Dysfunction: Decreased tongue base retraction;Decreased strength;Decreased elevation/closure  Oral Phase Severity: Mild  Pharyngeal Phase Severity: Mild    8-point Penetration-Aspiration Scale: Score 3    PAIN:  Pt reports 0/10 pain or discomfort prior to MBS. Pt reports 0/10 pain or discomfort post MBS. COMMUNICATION/EDUCATION:   [x]  Education provided post diagnostic testing including oropharyngeal anatomy/physiology, MBS results, diet recommendations and        compensatory strategies/positioning. [x]  Video feedback utilized. []  Handout regarding diet recommendations and thickener instructions provided. [x]  Patient/family have participated as able in goal setting and plan of care. []  Patient/family agree to work toward stated goals and plan of care. []  Patient understands intent and goals of therapy, but is neutral about his/her participation.   []  Patient is unable to participate in goal setting and plan of care.    Thank you for this referral,  BRADLEY Allen.S., [id number] [facility]  Speech-Language Pathologist

## 2021-11-01 ENCOUNTER — TELEPHONE (OUTPATIENT)
Dept: FAMILY MEDICINE CLINIC | Age: 46
End: 2021-11-01

## 2021-11-01 NOTE — TELEPHONE ENCOUNTER
Patient's mother called stating, due to the change in the weather, patient has a cough and runny nose. She'd like to bring him in before he gets a bad cold. Please advise.

## 2022-01-19 ENCOUNTER — TELEPHONE (OUTPATIENT)
Dept: FAMILY MEDICINE CLINIC | Age: 47
End: 2022-01-19

## 2022-01-19 NOTE — TELEPHONE ENCOUNTER
Spoke with patient's mother (guardian) has cough has had it appr 1 week no fever.   Has tried OTC cough meds no help-suggested urgent care but she wanted to see what you thought     Please advise

## 2022-01-19 NOTE — TELEPHONE ENCOUNTER
Pt mother calling because the pt has a really deep cough with no fever. She would like to speak to a nurse. Ms. Patrick Bose can be reached at [phone]. Please advise.  Thank you!!!

## 2022-03-18 PROBLEM — R79.89 LOW TSH LEVEL: Status: ACTIVE | Noted: 2018-09-11

## 2022-03-19 PROBLEM — Q90.9 DOWN SYNDROME: Status: ACTIVE | Noted: 2018-09-07

## 2022-03-20 PROBLEM — Z78.9 NOT IMMUNE TO HEPATITIS B VIRUS: Status: ACTIVE | Noted: 2018-09-11

## 2022-03-20 PROBLEM — R73.03 PREDIABETES: Status: ACTIVE | Noted: 2018-09-14

## 2022-04-07 ENCOUNTER — OFFICE VISIT (OUTPATIENT)
Dept: FAMILY MEDICINE CLINIC | Age: 47
End: 2022-04-07
Payer: MEDICARE

## 2022-04-07 VITALS
HEIGHT: 65 IN | HEART RATE: 62 BPM | WEIGHT: 156 LBS | RESPIRATION RATE: 20 BRPM | OXYGEN SATURATION: 98 % | BODY MASS INDEX: 25.99 KG/M2 | SYSTOLIC BLOOD PRESSURE: 126 MMHG | TEMPERATURE: 97.9 F | DIASTOLIC BLOOD PRESSURE: 79 MMHG

## 2022-04-07 DIAGNOSIS — R05.9 COUGH: ICD-10-CM

## 2022-04-07 DIAGNOSIS — J30.1 ALLERGIC RHINITIS DUE TO POLLEN, UNSPECIFIED SEASONALITY: ICD-10-CM

## 2022-04-07 DIAGNOSIS — L23.9 ALLERGIC DERMATITIS: ICD-10-CM

## 2022-04-07 DIAGNOSIS — R73.03 PREDIABETES: ICD-10-CM

## 2022-04-07 LAB — HBA1C MFR BLD HPLC: 5.2 %

## 2022-04-07 PROCEDURE — 99213 OFFICE O/P EST LOW 20 MIN: CPT | Performed by: FAMILY MEDICINE

## 2022-04-07 PROCEDURE — 83036 HEMOGLOBIN GLYCOSYLATED A1C: CPT | Performed by: FAMILY MEDICINE

## 2022-04-07 PROCEDURE — G8510 SCR DEP NEG, NO PLAN REQD: HCPCS | Performed by: FAMILY MEDICINE

## 2022-04-07 PROCEDURE — G8419 CALC BMI OUT NRM PARAM NOF/U: HCPCS | Performed by: FAMILY MEDICINE

## 2022-04-07 PROCEDURE — G8427 DOCREV CUR MEDS BY ELIG CLIN: HCPCS | Performed by: FAMILY MEDICINE

## 2022-04-07 RX ORDER — LEVOCETIRIZINE DIHYDROCHLORIDE 5 MG/1
5 TABLET, FILM COATED ORAL DAILY
Qty: 90 TABLET | Refills: 0 | Status: SHIPPED | OUTPATIENT
Start: 2022-04-07 | End: 2022-04-22

## 2022-04-07 RX ORDER — CODEINE PHOSPHATE AND GUAIFENESIN 10; 100 MG/5ML; MG/5ML
SOLUTION ORAL
COMMUNITY
Start: 2022-01-24 | End: 2022-04-07 | Stop reason: ALTCHOICE

## 2022-04-07 RX ORDER — CETIRIZINE HCL 10 MG
10 TABLET ORAL DAILY
COMMUNITY
Start: 2022-01-19 | End: 2022-04-07

## 2022-04-07 RX ORDER — ALBUTEROL SULFATE 90 UG/1
1-2 AEROSOL, METERED RESPIRATORY (INHALATION)
COMMUNITY
Start: 2020-11-04

## 2022-04-07 RX ORDER — GUAIFENESIN 400 MG/1
400 TABLET ORAL
Qty: 90 TABLET | Refills: 0 | Status: SHIPPED | OUTPATIENT
Start: 2022-04-07 | End: 2022-04-22

## 2022-04-07 RX ORDER — TRIAMCINOLONE ACETONIDE 0.25 MG/G
OINTMENT TOPICAL 2 TIMES DAILY
Qty: 30 G | Refills: 0 | Status: SHIPPED | OUTPATIENT
Start: 2022-04-07

## 2022-04-07 RX ORDER — FLUTICASONE PROPIONATE 50 MCG
1 SPRAY, SUSPENSION (ML) NASAL DAILY
COMMUNITY
Start: 2021-11-06 | End: 2022-04-22

## 2022-04-07 RX ORDER — PREDNISONE 20 MG/1
20 TABLET ORAL
Qty: 5 TABLET | Refills: 0 | Status: SHIPPED | OUTPATIENT
Start: 2022-04-07 | End: 2022-04-12

## 2022-04-07 NOTE — PATIENT INSTRUCTIONS
Cough: Care Instructions  Overview     A cough is your body's response to something that bothers your throat or airways. Many things can cause a cough. You might cough because of a cold or the flu, bronchitis, or asthma. Smoking, postnasal drip, allergies, and stomach acid that backs up into your throat also can cause coughs. A cough is a symptom, not a disease. Most coughs stop when the cause, such as a cold, goes away. You can take a few steps at home to cough less and feel better. Follow-up care is a key part of your treatment and safety. Be sure to make and go to all appointments, and call your doctor if you are having problems. It's also a good idea to know your test results and keep a list of the medicines you take. How can you care for yourself at home? · Drink lots of water and other fluids. This helps thin the mucus and soothes a dry or sore throat. Honey or lemon juice in hot water or tea may ease a dry cough. · Take cough medicine as directed by your doctor. · Prop up your head on pillows to help you breathe and ease a dry cough. · Try cough drops or hard candy to soothe a dry or sore throat. · Do not smoke. Avoid secondhand smoke. If you need help quitting, talk to your doctor about stop-smoking programs and medicines. These can increase your chances of quitting for good. When should you call for help? Call 911 anytime you think you may need emergency care. For example, call if:    · You have severe trouble breathing. Call your doctor now or seek immediate medical care if:    · You cough up blood.     · You have new or worse trouble breathing.     · You have a new or higher fever.     · You have a new rash.    Watch closely for changes in your health, and be sure to contact your doctor if:    · You cough more deeply or more often, especially if you notice more mucus or a change in the color of your mucus.     · You have new symptoms, such as a sore throat, an earache, or sinus pain.     · You do not get better as expected. Where can you learn more? Go to http://www.gray.com/  Enter D279 in the search box to learn more about \"Cough: Care Instructions. \"  Current as of: July 6, 2021               Content Version: 13.2  © 0526-1492 [employer], [employer]. Care instructions adapted under license by [employer] (which disclaims liability or warranty for this information). If you have questions about a medical condition or this instruction, always ask your healthcare professional. [place] [address] any warranty or liability for your use of this information.

## 2022-04-07 NOTE — PROGRESS NOTES
1. \"Have you been to the ER, urgent care clinic since your last visit? Hospitalized since your last visit? \" No    2. \"Have you seen or consulted any other health care providers outside of the [address] [place] [address] since your last visit? \" No     3. For patients aged 39-70: Has the patient had a colonoscopy / FIT/ Cologuard? No      If the patient is female:    4. For patients aged 41-77: Has the patient had a mammogram within the past 2 years? NA - based on age or sex      11. For patients aged 21-65: Has the patient had a pap smear?  NA - based on age or sex

## 2022-04-07 NOTE — PROGRESS NOTES
HPI  Is a 42-year-old -American male with past medical history significant for Down syndrome, prediabetes, history of seizures, seasonal allergies who is here to follow-up on cough and for rash. Allergic dermatitis  Patient [place] the [place] and then all of a sudden had an eruption of wheals on his neck extending down to his right arm. He has been itching it a lot. Will prescribe prednisone 20 mg for 5 days. Seasonal allergies  Patient is currently on Zyrtec, Flonase, Singulair. Continues to have watery eyes, sneezing, postnasal drainage and cough. We will change Zyrtec to Xyzal and see if this helps more. Advised to continue taking the Flonase twice a day and the Singulair daily. Advised to try the prednisone for 5 days and see this helps. For the cough you be given guaifenesin. 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  fluticasone propionate (FLONASE) 50 mcg/actuation nasal spray 1 Spray daily.  predniSONE (DELTASONE) 20 mg tablet Take 20 mg by mouth daily (with breakfast) for 5 days. 5 Tablet 0    triamcinolone acetonide (KENALOG) 0.025 % ointment Apply  to affected area two (2) times a day. use thin layer 30 g 0    levocetirizine (XYZAL) 5 mg tablet Take 1 Tablet by mouth daily for 90 days. 90 Tablet 0    guaiFENesin (ORGANIDIN) 400 mg tablet Take 1 Tablet by mouth every eight (8) hours as needed for Congestion for up to 30 days. 90 Tablet 0    montelukast (SINGULAIR) 10 mg tablet Take 1 Tab by mouth daily. 30 Tab 9    dextromethorphan (Delsym) 30 mg/5 mL liquid Take 60 mg by mouth two (2) times a day. (Patient not taking: Reported on 4/7/2022)         Allergies  Allergies   Allergen Reactions    Amoxicillin Hives    Codeine Seizures    Tramadol Seizures     Seizures       Family History  Family History   Problem Relation Age of Onset    Schizophrenia Mother     No Known Problems Father     No Known Problems Sister     No Known Problems Sister        Social History  Social History     Socioeconomic History    Marital status: SINGLE     Spouse name: Not on file    Number of children: Not on file    Years of education: Not on file    Highest education level: Not on file   Occupational History    Not on file   Tobacco Use    Smoking status: Never Smoker    Smokeless tobacco: Never Used   Vaping Use    Vaping Use: Never used   Substance and Sexual Activity    Alcohol use: No    Drug use: No    Sexual activity: Never   Other Topics Concern    Not on file   Social History Narrative    Not on file     Social Determinants of Health     Financial Resource Strain:     Difficulty of Paying Living Expenses: Not on file   Food Insecurity:     Worried About Running Out of Food in the Last Year: Not on file    Ollie of Food in the Last Year: Not on file   Transportation Needs:     Lack of Transportation (Medical): Not on file    Lack of Transportation (Non-Medical):  Not on file   Physical Activity:     Days of Exercise per Week: Not on file    Minutes of Exercise per Session: Not on file   Stress:     Feeling of Stress : Not on file   Social Connections:     Frequency of Communication with Friends and Family: Not on file    Frequency of Social Gatherings with Friends and Family: Not on file    Attends Buddhist Services: Not on file    Active Member of Clubs or Organizations: Not on file    Attends Club or Organization Meetings: Not on file    Marital Status: Not on file   Intimate Partner Violence:     Fear of Current or Ex-Partner: Not on file    Emotionally Abused: Not on file    Physically Abused: Not on file    Sexually Abused: Not on file   Housing Stability:     Unable to Pay for Housing in the Last Year: Not on file    Number of Places Lived in the Last Year: Not on file    Unstable Housing in the Last Year: Not on file       Review of Systems  Review of Systems   Constitutional: Negative for chills and fever. HENT: Positive for congestion. Respiratory: Positive for cough. Negative for sputum production and shortness of breath. Cardiovascular: Negative for chest pain and leg swelling. Gastrointestinal: Negative for abdominal pain, nausea and vomiting. Skin: Negative for rash. Neurological: Negative for dizziness and headaches. Vital Signs  Visit Vitals  /79 (BP 1 Location: Left upper arm, BP Patient Position: Sitting, BP Cuff Size: Adult long)   Pulse 62   Temp 97.9 °F (36.6 °C) (Temporal)   Resp 20   Ht 5' 5\" (1.651 m)   Wt 156 lb (70.8 kg)   SpO2 98%   BMI 25.96 kg/m²         Physical Exam  Physical Exam  Constitutional:       Appearance: Normal appearance. HENT:      Head: Normocephalic and atraumatic. Nose: Congestion and rhinorrhea present. Eyes:      General: No scleral icterus. Conjunctiva/sclera: Conjunctivae normal.   Cardiovascular:      Rate and Rhythm: Normal rate and regular rhythm. Heart sounds: Normal heart sounds. No murmur heard. No gallop. Pulmonary:      Effort: Pulmonary effort is normal. No respiratory distress. Breath sounds: No wheezing. Abdominal:      General: There is no distension. Palpations: Abdomen is soft. Musculoskeletal:      Cervical back: Normal range of motion and neck supple. Right lower leg: No edema. Left lower leg: No edema. Skin:     Findings: Rash present. No erythema. Comments: Wheels appreciated an patients neck. Extending down to the right arm.    Neurological: Mental Status: He is alert and oriented to person, place, and time. Mental status is at baseline. Results  Results for orders placed or performed in visit on 04/07/22   AMB POC HEMOGLOBIN A1C   Result Value Ref Range    Hemoglobin A1c (POC) 5.2 %       ASSESSMENT and PLAN  1. Allergic dermatitis  - predniSONE (DELTASONE) 20 mg tablet; Take 20 mg by mouth daily (with breakfast) for 5 days. Dispense: 5 Tablet; Refill: 0  - triamcinolone acetonide (KENALOG) 0.025 % ointment; Apply  to affected area two (2) times a day. use thin layer  Dispense: 30 g; Refill: 0    2. Allergic rhinitis due to pollen, unspecified seasonality  - levocetirizine (XYZAL) 5 mg tablet; Take 1 Tablet by mouth daily for 90 days. Dispense: 90 Tablet; Refill: 0  Continue Flonase twice daily  Continue montelukast      3. Prediabetes  - AMB POC HEMOGLOBIN A1C    4. Cough  -Advised warm water with honey lemon and ginger  -Advised using hard candies  Advised to use humidifier  - guaiFENesin (ORGANIDIN) 400 mg tablet; Take 1 Tablet by mouth every eight (8) hours as needed for Congestion for up to 30 days. Dispense: 90 Tablet; Refill: 0       Advised to follow-up in 1 month 6 months to 1 year to establish care with new PCP as I am leaving. Most of providers accepting new patients in the area given to patient. Follow-up and Dispositions    · Return in about 1 year (around 4/7/2023), or if symptoms worsen or fail to improve, for est care new PCP-List of providers in area given to pt/family. I have discussed the diagnosis with the patient and the intended plan of care as seen in the above orders. The patient has received an after-visit summary and questions were answered concerning future plans. I have discussed medication, side effects, and warnings with the patient in detail. The patient verbalized understanding and is in agreement with the plan of care.  The patient will contact the office with any additional concerns. I spent at least 30 minutes on this visit with this established patient. Nico Pelaez MD    PLEASE NOTE:   This document has been produced using voice recognition software.  Unrecognized errors in transcription may be present

## 2022-04-21 ENCOUNTER — TELEPHONE (OUTPATIENT)
Dept: FAMILY MEDICINE CLINIC | Age: 47
End: 2022-04-21

## 2022-04-21 NOTE — TELEPHONE ENCOUNTER
Pt mom called and stated her son has watery eyes, sneezing, and coughing. She wants to know can Dr. Preeti Escamilla prescribe him something for his symptoms. Mrs. Yeager can be reached at [phone]. Please advise.  Thank you!!!

## 2022-04-22 ENCOUNTER — VIRTUAL VISIT (OUTPATIENT)
Dept: FAMILY MEDICINE CLINIC | Age: 47
End: 2022-04-22
Payer: MEDICARE

## 2022-04-22 DIAGNOSIS — R05.9 COUGH: Primary | ICD-10-CM

## 2022-04-22 DIAGNOSIS — J30.1 ALLERGIC RHINITIS DUE TO POLLEN, UNSPECIFIED SEASONALITY: ICD-10-CM

## 2022-04-22 PROCEDURE — G8427 DOCREV CUR MEDS BY ELIG CLIN: HCPCS | Performed by: FAMILY MEDICINE

## 2022-04-22 PROCEDURE — G8432 DEP SCR NOT DOC, RNG: HCPCS | Performed by: FAMILY MEDICINE

## 2022-04-22 PROCEDURE — 99213 OFFICE O/P EST LOW 20 MIN: CPT | Performed by: FAMILY MEDICINE

## 2022-04-22 RX ORDER — GUAIFENESIN 100 MG/5ML
400 SOLUTION ORAL
Qty: 236 ML | Refills: 5 | Status: SHIPPED | OUTPATIENT
Start: 2022-04-22 | End: 2022-06-21

## 2022-04-22 RX ORDER — FLUTICASONE PROPIONATE 50 MCG
SPRAY, SUSPENSION (ML) NASAL
Qty: 1 EACH | Refills: 4 | Status: SHIPPED | OUTPATIENT
Start: 2022-04-22

## 2022-04-22 NOTE — PROGRESS NOTES
Rj Contreras (: 1975) is a 52 y.o. male, established patient, here for evaluation of the following chief complaint(s):   No chief complaint on file. ASSESSMENT/PLAN:  Below is the assessment and plan developed based on review of pertinent history, labs, studies, and medications. 1. Cough  -     guaiFENesin (ROBITUSSIN) 100 mg/5 mL liquid; Take 20 mL by mouth three (3) times daily as needed for Cough or Congestion for up to 60 days. , Normal, Disp-236 mL, R-5  2. Allergic rhinitis due to pollen, unspecified seasonality  -     fluticasone propionate (FLONASE) 50 mcg/actuation nasal spray; One spray in each nostril daily at bed time, Normal, Disp-1 Each, R-4      Return in about 6 months (around 10/22/2022), or if symptoms worsen or fail to improve, for est care with new PCP-list given to patient at last visit. SUBJECTIVE/OBJECTIVE:  HPI   Is a 22-year-old -American male with past medical history significant for Down syndrome, prediabetes, history of seizures, seasonal allergies who is here to follow-up on cough and seasonal allergies. Seasonal Allergic Rhinitis/cough  Patient is currently on Zyrtec. Patient's mother has tried Flonase in the past which caused him to have nosebleeds. Advised her to aim towards the ear and make sure she only sprays it once in each nostril. Patient is having a lot of runny nose, cough, runny eyes, congestion, sneezing. Mother continues to request something for his cough and is fixated on his cough. Previously prescribed guaifenesin tablets which according to mother did not help. I will try guaifenesin syrup and see if this helps. Advised patient to continue montelukast.  Gave them the option of seeing an allergy immunologist if his allergies are severe enough to cause a lot of issues then it might be worth seeing an allergy immunology doctor. Mother of patient declined stating she does not see the purpose of seeing them.     She states that he was prescribed something in the past that helps with his cough but does not know the name of it. Of note by guaifenesin with codeine was previously on his medication list.         Review of Systems   HENT: Positive for congestion, postnasal drip and rhinorrhea. Eyes: Positive for redness and itching. Respiratory: Positive for cough. Negative for choking, chest tightness and shortness of breath. Cardiovascular: Negative for chest pain and palpitations. Gastrointestinal: Negative for abdominal distention and diarrhea. Neurological: Negative for dizziness, light-headedness and headaches.         No data recorded     Physical Exam    [INSTRUCTIONS:  \"[x]\" Indicates a positive item  \"[]\" Indicates a negative item  -- DELETE ALL ITEMS NOT EXAMINED]    Constitutional: [x] Appears well-developed and well-nourished [x] No apparent distress      [] Abnormal -     Mental status: [x] Alert and awake  [x] Oriented to person/place/time [x] Able to follow commands    [] Abnormal -     Eyes:   EOM    [x]  Normal    [] Abnormal -   Sclera  [x]  Normal    [] Abnormal -          Discharge [x]  None visible   [] Abnormal -     HENT: [x] Normocephalic, atraumatic  [] Abnormal -   [x] Mouth/Throat: Mucous membranes are moist    External Ears [x] Normal  [] Abnormal -    Neck: [x] No visualized mass [] Abnormal -     Pulmonary/Chest: [x] Respiratory effort normal   [x] No visualized signs of difficulty breathing or respiratory distress        [] Abnormal -      Musculoskeletal:   [x] Normal gait with no signs of ataxia         [x] Normal range of motion of neck        [] Abnormal -     Neurological:        [x] No Facial Asymmetry (Cranial nerve 7 motor function) (limited exam due to video visit)          [x] No gaze palsy        [] Abnormal -          Skin:        [x] No significant exanthematous lesions or discoloration noted on facial skin         [] Abnormal -            Psychiatric:       [x] Normal Affect [] Abnormal - [x] No Hallucinations    Other pertinent observable physical exam findings:-        On this date 04/22/2022 I have spent 30 minutes reviewing previous notes, test results and face to face (virtual) with the patient discussing the diagnosis and importance of compliance with the treatment plan as well as documenting on the day of the visit. Tatum Fuentes, was evaluated through a synchronous (real-time) audio-video encounter. The patient (or guardian if applicable) is aware that this is a billable service, which includes applicable co-pays. Verbal consent to proceed has been obtained. The visit was conducted pursuant to the emergency declaration under the [facility], [address] [place] [address] authority and the [employer] and [employer] Act. Patient identification was verified, and a caregiver was present when appropriate. The patient was located at home in a state where the provider was licensed to provide care. An electronic signature was used to authenticate this note.   -- Zuleika Mann MD

## 2022-05-24 DIAGNOSIS — Z76.0 MEDICATION REFILL: ICD-10-CM

## 2022-05-24 DIAGNOSIS — J31.0 CHRONIC RHINITIS: ICD-10-CM

## 2022-05-24 RX ORDER — MONTELUKAST SODIUM 10 MG/1
TABLET ORAL
Qty: 30 TABLET | Refills: 1 | Status: SHIPPED | OUTPATIENT
Start: 2022-05-24

## 2022-05-24 NOTE — TELEPHONE ENCOUNTER
Please see refill request    Patient was last seen on 4-    Last prescribed on 4-  #30 X 9    Thank you

## 2024-03-15 ENCOUNTER — OFFICE VISIT (OUTPATIENT)
Facility: CLINIC | Age: 49
End: 2024-03-15
Payer: MEDICAID

## 2024-03-15 VITALS
TEMPERATURE: 98.3 F | WEIGHT: 165 LBS | HEIGHT: 65 IN | SYSTOLIC BLOOD PRESSURE: 100 MMHG | DIASTOLIC BLOOD PRESSURE: 70 MMHG | BODY MASS INDEX: 27.49 KG/M2 | HEART RATE: 73 BPM | OXYGEN SATURATION: 96 % | RESPIRATION RATE: 18 BRPM

## 2024-03-15 DIAGNOSIS — Z12.11 SCREENING FOR COLON CANCER: ICD-10-CM

## 2024-03-15 DIAGNOSIS — Z11.4 ENCOUNTER FOR SCREENING FOR HIV: ICD-10-CM

## 2024-03-15 DIAGNOSIS — Z13.1 DIABETES MELLITUS SCREENING: ICD-10-CM

## 2024-03-15 DIAGNOSIS — L50.9 HIVES: ICD-10-CM

## 2024-03-15 DIAGNOSIS — Z13.29 THYROID DISORDER SCREENING: ICD-10-CM

## 2024-03-15 DIAGNOSIS — R53.82 CHRONIC FATIGUE: ICD-10-CM

## 2024-03-15 DIAGNOSIS — Z13.220 NEED FOR LIPID SCREENING: ICD-10-CM

## 2024-03-15 DIAGNOSIS — J30.1 SEASONAL ALLERGIC RHINITIS DUE TO POLLEN: Primary | ICD-10-CM

## 2024-03-15 DIAGNOSIS — Z11.59 ENCOUNTER FOR HCV SCREENING TEST FOR LOW RISK PATIENT: ICD-10-CM

## 2024-03-15 DIAGNOSIS — E55.9 VITAMIN D DEFICIENCY: ICD-10-CM

## 2024-03-15 PROCEDURE — 99204 OFFICE O/P NEW MOD 45 MIN: CPT | Performed by: FAMILY MEDICINE

## 2024-03-15 RX ORDER — TRIAMCINOLONE ACETONIDE 0.25 MG/G
CREAM TOPICAL
Qty: 15 G | Refills: 1 | Status: SHIPPED | OUTPATIENT
Start: 2024-03-15

## 2024-03-15 RX ORDER — LORATADINE 10 MG/1
10 TABLET ORAL DAILY
Qty: 90 TABLET | Refills: 1 | Status: SHIPPED | OUTPATIENT
Start: 2024-03-15

## 2024-03-15 RX ORDER — FLUTICASONE PROPIONATE 50 MCG
1 SPRAY, SUSPENSION (ML) NASAL DAILY
Qty: 32 G | Refills: 1 | Status: SHIPPED | OUTPATIENT
Start: 2024-03-15

## 2024-03-15 SDOH — ECONOMIC STABILITY: FOOD INSECURITY: WITHIN THE PAST 12 MONTHS, YOU WORRIED THAT YOUR FOOD WOULD RUN OUT BEFORE YOU GOT MONEY TO BUY MORE.: NEVER TRUE

## 2024-03-15 SDOH — ECONOMIC STABILITY: HOUSING INSECURITY
IN THE LAST 12 MONTHS, WAS THERE A TIME WHEN YOU DID NOT HAVE A STEADY PLACE TO SLEEP OR SLEPT IN A SHELTER (INCLUDING NOW)?: NO

## 2024-03-15 SDOH — ECONOMIC STABILITY: FOOD INSECURITY: WITHIN THE PAST 12 MONTHS, THE FOOD YOU BOUGHT JUST DIDN'T LAST AND YOU DIDN'T HAVE MONEY TO GET MORE.: NEVER TRUE

## 2024-03-15 SDOH — ECONOMIC STABILITY: INCOME INSECURITY: HOW HARD IS IT FOR YOU TO PAY FOR THE VERY BASICS LIKE FOOD, HOUSING, MEDICAL CARE, AND HEATING?: NOT HARD AT ALL

## 2024-03-15 ASSESSMENT — PATIENT HEALTH QUESTIONNAIRE - PHQ9
SUM OF ALL RESPONSES TO PHQ9 QUESTIONS 1 & 2: 0
SUM OF ALL RESPONSES TO PHQ QUESTIONS 1-9: 0
2. FEELING DOWN, DEPRESSED OR HOPELESS: NOT AT ALL
SUM OF ALL RESPONSES TO PHQ QUESTIONS 1-9: 0
1. LITTLE INTEREST OR PLEASURE IN DOING THINGS: NOT AT ALL
SUM OF ALL RESPONSES TO PHQ QUESTIONS 1-9: 0
SUM OF ALL RESPONSES TO PHQ QUESTIONS 1-9: 0

## 2024-03-15 NOTE — PROGRESS NOTES
\"Have you been to the ER, urgent care clinic since your last visit?  Hospitalized since your last visit?\"    NO    “Have you seen or consulted any other health care providers outside of [facility] since your last visit?”    NO        “Have you had a colorectal cancer screening such as a colonoscopy/FIT/Cologuard?    NO    No colonoscopy on file  No cologuard on file  No FIT/FOBT on file   No flexible sigmoidoscopy on file         Click Here for Release of Records Request

## 2024-04-04 ASSESSMENT — ENCOUNTER SYMPTOMS: COUGH: 1

## 2024-04-05 ENCOUNTER — OFFICE VISIT (OUTPATIENT)
Facility: CLINIC | Age: 49
End: 2024-04-05
Payer: MEDICAID

## 2024-04-05 VITALS
HEART RATE: 89 BPM | RESPIRATION RATE: 18 BRPM | DIASTOLIC BLOOD PRESSURE: 77 MMHG | TEMPERATURE: 98.4 F | SYSTOLIC BLOOD PRESSURE: 121 MMHG | BODY MASS INDEX: 27.16 KG/M2 | WEIGHT: 163 LBS | HEIGHT: 65 IN | OXYGEN SATURATION: 96 %

## 2024-04-05 DIAGNOSIS — T78.40XD SEVERE ALLERGY, SUBSEQUENT ENCOUNTER: ICD-10-CM

## 2024-04-05 DIAGNOSIS — Z12.11 SCREENING FOR COLON CANCER: Primary | ICD-10-CM

## 2024-04-05 PROCEDURE — 99213 OFFICE O/P EST LOW 20 MIN: CPT | Performed by: FAMILY MEDICINE

## 2024-04-05 RX ORDER — METHYLPREDNISOLONE 4 MG/1
TABLET ORAL
Qty: 1 KIT | Refills: 0 | Status: SHIPPED | OUTPATIENT
Start: 2024-04-05 | End: 2024-04-11

## 2024-04-05 SDOH — ECONOMIC STABILITY: FOOD INSECURITY: WITHIN THE PAST 12 MONTHS, YOU WORRIED THAT YOUR FOOD WOULD RUN OUT BEFORE YOU GOT MONEY TO BUY MORE.: NEVER TRUE

## 2024-04-05 SDOH — ECONOMIC STABILITY: INCOME INSECURITY: HOW HARD IS IT FOR YOU TO PAY FOR THE VERY BASICS LIKE FOOD, HOUSING, MEDICAL CARE, AND HEATING?: NOT HARD AT ALL

## 2024-04-05 SDOH — ECONOMIC STABILITY: FOOD INSECURITY: WITHIN THE PAST 12 MONTHS, THE FOOD YOU BOUGHT JUST DIDN'T LAST AND YOU DIDN'T HAVE MONEY TO GET MORE.: NEVER TRUE

## 2024-04-05 ASSESSMENT — PATIENT HEALTH QUESTIONNAIRE - PHQ9
SUM OF ALL RESPONSES TO PHQ QUESTIONS 1-9: 0
2. FEELING DOWN, DEPRESSED OR HOPELESS: NOT AT ALL
SUM OF ALL RESPONSES TO PHQ QUESTIONS 1-9: 0
1. LITTLE INTEREST OR PLEASURE IN DOING THINGS: NOT AT ALL
SUM OF ALL RESPONSES TO PHQ9 QUESTIONS 1 & 2: 0

## 2024-04-05 ASSESSMENT — ENCOUNTER SYMPTOMS: URTICARIA: 1

## 2024-04-05 NOTE — PROGRESS NOTES
\"Have you been to the ER, urgent care clinic since your last visit?  Hospitalized since your last visit?\"    NO    “Have you seen or consulted any other health care providers outside of [facility] since your last visit?”    NO        “Have you had a colorectal cancer screening such as a colonoscopy/FIT/Cologuard?    NO    No colonoscopy on file  No cologuard on file  No FIT/FOBT on file   No flexible sigmoidoscopy on file         Click Here for Release of Records Request

## 2024-04-05 NOTE — PROGRESS NOTES
Santiago Prince is a 49 y.o. male who presents to the office today for the following:  Chief Complaint   Patient presents with    Urticaria    Allergies       Allergies   Allergen Reactions    Amoxicillin Hives    Codeine Seizure    Tramadol Seizure     Seizures         Current Outpatient Medications:     methylPREDNISolone (MEDROL DOSEPACK) 4 MG tablet, Take by mouth., Disp: 1 kit, Rfl: 0    loratadine (CLARITIN) 10 MG tablet, Take 1 tablet by mouth daily, Disp: 90 tablet, Rfl: 1    fluticasone (FLONASE) 50 MCG/ACT nasal spray, 1 spray by Each Nostril route daily, Disp: 32 g, Rfl: 1    triamcinolone (KENALOG) 0.025 % cream, Apply topically 2 times daily., Disp: 15 g, Rfl: 1      Past Medical History:   Diagnosis Date    Down syndrome     Prediabetes 09/2018    initial A1c 5.7%    Seizures (HCC)        Past Surgical History:   Procedure Laterality Date    OTHER SURGICAL HISTORY Right 1979    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       Social History     Socioeconomic History    Marital status: Single     Spouse name: Not on file    Number of children: Not on file    Years of education: Not on file    Highest education level: Not on file   Occupational History    Not on file   Tobacco Use    Smoking status: Never    Smokeless tobacco: Never   Vaping Use    Vaping Use: Never used   Substance and Sexual Activity    Alcohol use: No    Drug use: No    Sexual activity: Not on file   Other Topics Concern    Not on file   Social History Narrative    Not on file     Social Determinants of Health     Financial Resource Strain: Low Risk  (4/5/2024)    Overall Financial Resource Strain (CARDIA)     Difficulty of Paying Living Expenses: Not hard at all   Food Insecurity: No Food Insecurity (4/5/2024)    Hunger Vital Sign     Worried About Running Out of Food in the Last Year: Never true     Ran Out of Food in the Last Year: Never true   Transportation Needs: Unknown (4/5/2024)    PRAPARE - Transportation     Lack of Transportation

## 2024-04-05 NOTE — PROGRESS NOTES
Santiago Prince is a 49 y.o. male who presents to the office today for the following:  Chief Complaint   Patient presents with    New Patient    Cough       Allergies   Allergen Reactions    Amoxicillin Hives    Codeine Seizure    Tramadol Seizure     Seizures         Current Outpatient Medications:     loratadine (CLARITIN) 10 MG tablet, Take 1 tablet by mouth daily, Disp: 90 tablet, Rfl: 1    fluticasone (FLONASE) 50 MCG/ACT nasal spray, 1 spray by Each Nostril route daily, Disp: 32 g, Rfl: 1    triamcinolone (KENALOG) 0.025 % cream, Apply topically 2 times daily., Disp: 15 g, Rfl: 1      Past Medical History:   Diagnosis Date    Down syndrome     Prediabetes 09/2018    initial A1c 5.7%    Seizures (HCC)        Past Surgical History:   Procedure Laterality Date    OTHER SURGICAL HISTORY Right 1979    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       Social History     Socioeconomic History    Marital status: Single     Spouse name: Not on file    Number of children: Not on file    Years of education: Not on file    Highest education level: Not on file   Occupational History    Not on file   Tobacco Use    Smoking status: Never    Smokeless tobacco: Never   Vaping Use    Vaping Use: Never used   Substance and Sexual Activity    Alcohol use: No    Drug use: No    Sexual activity: Not on file   Other Topics Concern    Not on file   Social History Narrative    Not on file     Social Determinants of Health     Financial Resource Strain: Low Risk  (3/15/2024)    Overall Financial Resource Strain (CARDIA)     Difficulty of Paying Living Expenses: Not hard at all   Food Insecurity: No Food Insecurity (3/15/2024)    Hunger Vital Sign     Worried About Running Out of Food in the Last Year: Never true     Ran Out of Food in the Last Year: Never true   Transportation Needs: Unknown (3/15/2024)    PRAPARE - Transportation     Lack of Transportation (Medical): Not on file     Lack of Transportation (Non-Medical): No   Physical Activity:

## 2024-04-11 LAB
A/G RATIO: 1.4 RATIO (ref 1.1–2.6)
ALBUMIN SERPL-MCNC: 4.1 G/DL (ref 3.5–5)
ALP BLD-CCNC: 56 U/L (ref 25–115)
ALT SERPL-CCNC: 12 U/L (ref 5–40)
ANION GAP SERPL CALCULATED.3IONS-SCNC: 12 MMOL/L (ref 3–15)
AST SERPL-CCNC: 11 U/L (ref 10–37)
BILIRUB SERPL-MCNC: 0.5 MG/DL (ref 0.2–1.2)
BUN BLDV-MCNC: 24 MG/DL (ref 6–22)
CALCIUM SERPL-MCNC: 8.8 MG/DL (ref 8.4–10.5)
CHLORIDE BLD-SCNC: 106 MMOL/L (ref 98–110)
CHOLESTEROL/HDL RATIO: 2.9 (ref 0–5)
CHOLESTEROL: 274 MG/DL (ref 110–200)
CO2: 26 MMOL/L (ref 20–32)
CREAT SERPL-MCNC: 1.2 MG/DL (ref 0.5–1.2)
ESTIMATED AVERAGE GLUCOSE: 114 MG/DL (ref 91–123)
GLOBULIN: 3 G/DL (ref 2–4)
GLOMERULAR FILTRATION RATE: >60 ML/MIN/1.73 SQ.M.
GLUCOSE: 89 MG/DL (ref 70–99)
HBA1C MFR BLD: 5.6 % (ref 4.8–5.6)
HCT VFR BLD CALC: 45.5 % (ref 39.3–51.6)
HDLC SERPL-MCNC: 93 MG/DL
HEMOGLOBIN: 15.1 G/DL (ref 13.1–17.2)
HEPATITIS C ANTIBODY: NORMAL
HIV -1/0/2 AG/AB WITH REFLEX: NON REACTIVE
HIV INTERPRETATION: NORMAL
LDL CHOLESTEROL CALCULATED: 164 MG/DL (ref 50–99)
LDL/HDL RATIO: 1.8
MCH RBC QN AUTO: 33 PG (ref 26–34)
MCHC RBC AUTO-ENTMCNC: 33 G/DL (ref 31–36)
MCV RBC AUTO: 98 FL (ref 80–95)
NON-HDL CHOLESTEROL: 181 MG/DL
PDW BLD-RTO: 15.6 % (ref 10–15.5)
PLATELET # BLD: 223 K/UL (ref 140–440)
PMV BLD AUTO: 10.2 FL (ref 9–13)
POTASSIUM SERPL-SCNC: 3.9 MMOL/L (ref 3.5–5.5)
RBC: 4.65 M/UL (ref 3.8–5.8)
SODIUM BLD-SCNC: 144 MMOL/L (ref 133–145)
TOTAL PROTEIN: 7.1 G/DL (ref 6.4–8.3)
TRIGL SERPL-MCNC: 83 MG/DL (ref 40–149)
TSH SERPL DL<=0.05 MIU/L-ACNC: 1.86 MCU/ML (ref 0.27–4.2)
VITAMIN D 25-HYDROXY: 10.6 NG/ML (ref 32–100)
VLDLC SERPL CALC-MCNC: 17 MG/DL (ref 8–30)
WBC: 4.9 K/UL (ref 4–11)

## 2024-04-15 DIAGNOSIS — E55.9 VITAMIN D DEFICIENCY: Primary | ICD-10-CM

## 2024-04-15 RX ORDER — ERGOCALCIFEROL 1.25 MG/1
50000 CAPSULE ORAL WEEKLY
Qty: 12 CAPSULE | Refills: 2 | Status: SHIPPED | OUTPATIENT
Start: 2024-04-15

## 2024-06-13 ENCOUNTER — OFFICE VISIT (OUTPATIENT)
Facility: CLINIC | Age: 49
End: 2024-06-13
Payer: MEDICAID

## 2024-06-13 VITALS
DIASTOLIC BLOOD PRESSURE: 66 MMHG | BODY MASS INDEX: 27.16 KG/M2 | HEART RATE: 85 BPM | WEIGHT: 163 LBS | RESPIRATION RATE: 18 BRPM | OXYGEN SATURATION: 96 % | TEMPERATURE: 98.4 F | SYSTOLIC BLOOD PRESSURE: 104 MMHG | HEIGHT: 65 IN

## 2024-06-13 DIAGNOSIS — E55.9 VITAMIN D DEFICIENCY: ICD-10-CM

## 2024-06-13 DIAGNOSIS — R05.1 ACUTE COUGH: Primary | ICD-10-CM

## 2024-06-13 PROCEDURE — 99213 OFFICE O/P EST LOW 20 MIN: CPT | Performed by: FAMILY MEDICINE

## 2024-06-13 RX ORDER — ERGOCALCIFEROL 1.25 MG/1
50000 CAPSULE ORAL WEEKLY
Qty: 12 CAPSULE | Refills: 1 | Status: SHIPPED | OUTPATIENT
Start: 2024-06-13

## 2024-06-13 RX ORDER — CODEINE PHOSPHATE/GUAIFENESIN 10-100MG/5
5 LIQUID (ML) ORAL 2 TIMES DAILY PRN
Qty: 180 ML | Refills: 0 | Status: SHIPPED | OUTPATIENT
Start: 2024-06-13 | End: 2024-06-27

## 2024-06-13 SDOH — ECONOMIC STABILITY: FOOD INSECURITY: WITHIN THE PAST 12 MONTHS, THE FOOD YOU BOUGHT JUST DIDN'T LAST AND YOU DIDN'T HAVE MONEY TO GET MORE.: NEVER TRUE

## 2024-06-13 SDOH — ECONOMIC STABILITY: INCOME INSECURITY: HOW HARD IS IT FOR YOU TO PAY FOR THE VERY BASICS LIKE FOOD, HOUSING, MEDICAL CARE, AND HEATING?: NOT HARD AT ALL

## 2024-06-13 SDOH — ECONOMIC STABILITY: FOOD INSECURITY: WITHIN THE PAST 12 MONTHS, YOU WORRIED THAT YOUR FOOD WOULD RUN OUT BEFORE YOU GOT MONEY TO BUY MORE.: NEVER TRUE

## 2024-06-13 ASSESSMENT — PATIENT HEALTH QUESTIONNAIRE - PHQ9
SUM OF ALL RESPONSES TO PHQ9 QUESTIONS 1 & 2: 0
1. LITTLE INTEREST OR PLEASURE IN DOING THINGS: NOT AT ALL
SUM OF ALL RESPONSES TO PHQ QUESTIONS 1-9: 0
2. FEELING DOWN, DEPRESSED OR HOPELESS: NOT AT ALL

## 2024-06-13 NOTE — PROGRESS NOTES
\"Have you been to the ER, urgent care clinic since your last visit?  Hospitalized since your last visit?\"    NO    “Have you seen or consulted any other health care providers outside of [facility] since your last visit?”    NO        “Have you had a colorectal cancer screening such as a colonoscopy/FIT/Cologuard?    NO    No colonoscopy on file  No cologuard on file  No FIT/FOBT on file   No flexible sigmoidoscopy on file         Click Here for Release of Records Request   
PRAPARE - Transportation     Lack of Transportation (Medical): Not on file     Lack of Transportation (Non-Medical): No   Physical Activity: Not on file   Stress: Not on file   Social Connections: Not on file   Intimate Partner Violence: Not on file   Housing Stability: Unknown (4/5/2024)    Housing Stability Vital Sign     Unable to Pay for Housing in the Last Year: Not on file     Number of Places Lived in the Last Year: Not on file     Unstable Housing in the Last Year: No     or  Social History     Tobacco Use   Smoking Status Never   Smokeless Tobacco Never       Family History   Problem Relation Age of Onset    No Known Problems Sister     No Known Problems Sister     No Known Problems Father     Schizophrenia Mother          HPI:  HPI  Pt has been coughing. Worse when he goes to bed.  Has been sneezing. Rubbing of eyes.  Watery eyes.  Rhinitis clear.  Clearing throat.    Slept yesterday fatigued. No Fever /chills/ diarrhea constipation.  Currently taking Flonase and Claritin for allergies.  Has not been any better with the Tessalon    ROS:  Review of Systems  Coughing, rhinitis, sneezing    Physical Exam:  /66   Pulse 85   Temp 98.4 °F (36.9 °C) (Temporal)   Resp 18   Ht 1.651 m (5' 5\")   Wt 73.9 kg (163 lb)   SpO2 96%   BMI 27.12 kg/m²   Physical Exam  Vitals and nursing note reviewed.   Constitutional:       Appearance: Normal appearance.   Cardiovascular:      Rate and Rhythm: Normal rate and regular rhythm.   Pulmonary:      Effort: Pulmonary effort is normal.      Breath sounds: Normal breath sounds.   Neurological:      Mental Status: He is alert.        Assessment/Plan:    #1 allergies  Coughing suspected due to allergies due to sneezing, rhinitis, coughing.  However already on Flonase and Claritin.  Trial of the codeine with guaifenesin to see if this may help relieve symptoms.  Follow-up if no better.  Avoid allergens.  Consider allergy testing in the future.      Ary Ball,

## 2024-08-02 ENCOUNTER — OFFICE VISIT (OUTPATIENT)
Facility: CLINIC | Age: 49
End: 2024-08-02
Payer: MEDICAID

## 2024-08-02 VITALS
RESPIRATION RATE: 18 BRPM | BODY MASS INDEX: 26.68 KG/M2 | WEIGHT: 166 LBS | OXYGEN SATURATION: 97 % | SYSTOLIC BLOOD PRESSURE: 114 MMHG | DIASTOLIC BLOOD PRESSURE: 76 MMHG | HEIGHT: 66 IN | TEMPERATURE: 98.2 F | HEART RATE: 87 BPM

## 2024-08-02 DIAGNOSIS — J30.89 SEASONAL ALLERGIC RHINITIS DUE TO OTHER ALLERGIC TRIGGER: ICD-10-CM

## 2024-08-02 DIAGNOSIS — R05.1 ACUTE COUGH: Primary | ICD-10-CM

## 2024-08-02 PROCEDURE — 99214 OFFICE O/P EST MOD 30 MIN: CPT | Performed by: FAMILY MEDICINE

## 2024-08-02 RX ORDER — ERYTHROMYCIN 5 MG/G
1.25 OINTMENT OPHTHALMIC 4 TIMES DAILY
COMMUNITY
Start: 2024-07-26 | End: 2024-08-02

## 2024-08-02 RX ORDER — OLOPATADINE HYDROCHLORIDE 1 MG/ML
1 SOLUTION/ DROPS OPHTHALMIC 2 TIMES DAILY
Qty: 5 ML | Refills: 0 | Status: SHIPPED | OUTPATIENT
Start: 2024-08-02 | End: 2024-09-01

## 2024-08-02 RX ORDER — CODEINE PHOSPHATE/GUAIFENESIN 10-100MG/5
5 LIQUID (ML) ORAL 2 TIMES DAILY PRN
Qty: 180 ML | Refills: 0 | Status: SHIPPED | OUTPATIENT
Start: 2024-08-02 | End: 2024-08-16

## 2024-08-02 SDOH — ECONOMIC STABILITY: FOOD INSECURITY: WITHIN THE PAST 12 MONTHS, THE FOOD YOU BOUGHT JUST DIDN'T LAST AND YOU DIDN'T HAVE MONEY TO GET MORE.: NEVER TRUE

## 2024-08-02 SDOH — ECONOMIC STABILITY: INCOME INSECURITY: HOW HARD IS IT FOR YOU TO PAY FOR THE VERY BASICS LIKE FOOD, HOUSING, MEDICAL CARE, AND HEATING?: NOT HARD AT ALL

## 2024-08-02 SDOH — ECONOMIC STABILITY: FOOD INSECURITY: WITHIN THE PAST 12 MONTHS, YOU WORRIED THAT YOUR FOOD WOULD RUN OUT BEFORE YOU GOT MONEY TO BUY MORE.: NEVER TRUE

## 2024-08-02 ASSESSMENT — PATIENT HEALTH QUESTIONNAIRE - PHQ9
2. FEELING DOWN, DEPRESSED OR HOPELESS: NOT AT ALL
SUM OF ALL RESPONSES TO PHQ QUESTIONS 1-9: 0
SUM OF ALL RESPONSES TO PHQ QUESTIONS 1-9: 0
SUM OF ALL RESPONSES TO PHQ9 QUESTIONS 1 & 2: 0
1. LITTLE INTEREST OR PLEASURE IN DOING THINGS: NOT AT ALL
SUM OF ALL RESPONSES TO PHQ QUESTIONS 1-9: 0
SUM OF ALL RESPONSES TO PHQ QUESTIONS 1-9: 0

## 2024-08-02 NOTE — PROGRESS NOTES
Santiago Prince is a 49 y.o. male who presents to the office today for the following:  Chief Complaint   Patient presents with    Eye Problem       Allergies   Allergen Reactions    Amoxicillin Hives    Codeine Seizure    Tessalon [Benzonatate]      Does not help    Tramadol Seizure     Seizures         Current Outpatient Medications:     erythromycin (ROMYCIN) 5 MG/GM ophthalmic ointment, 1.25 cm 4 times daily, Disp: , Rfl:     vitamin D (ERGOCALCIFEROL) 1.25 MG (51386 UT) CAPS capsule, Take 1 capsule by mouth once a week, Disp: 12 capsule, Rfl: 1    loratadine (CLARITIN) 10 MG tablet, Take 1 tablet by mouth daily, Disp: 90 tablet, Rfl: 1    fluticasone (FLONASE) 50 MCG/ACT nasal spray, 1 spray by Each Nostril route daily, Disp: 32 g, Rfl: 1    triamcinolone (KENALOG) 0.025 % cream, Apply topically 2 times daily., Disp: 15 g, Rfl: 1      Past Medical History:   Diagnosis Date    Down syndrome     Prediabetes 09/2018    initial A1c 5.7%    Seizures (HCC)        Past Surgical History:   Procedure Laterality Date    OTHER SURGICAL HISTORY Right 1979    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       Social History     Socioeconomic History    Marital status: Single     Spouse name: Not on file    Number of children: Not on file    Years of education: Not on file    Highest education level: Not on file   Occupational History    Not on file   Tobacco Use    Smoking status: Never    Smokeless tobacco: Never   Vaping Use    Vaping Use: Never used   Substance and Sexual Activity    Alcohol use: No    Drug use: No    Sexual activity: Not on file   Other Topics Concern    Not on file   Social History Narrative    Not on file     Social Determinants of Health     Financial Resource Strain: Low Risk  (8/2/2024)    Overall Financial Resource Strain (CARDIA)     Difficulty of Paying Living Expenses: Not hard at all   Food Insecurity: No Food Insecurity (8/2/2024)    Hunger Vital Sign     Worried About Running Out of Food in the Last

## 2024-08-02 NOTE — PROGRESS NOTES
\"Have you been to the ER, urgent care clinic since your last visit?  Hospitalized since your last visit?\"    YES - When: approximately 7 days ago.  Where and Why: [facility] for eye drainage.    “Have you seen or consulted any other health care providers outside of [facility] since your last visit?”    NO        “Have you had a colorectal cancer screening such as a colonoscopy/FIT/Cologuard?    NO    No colonoscopy on file  No cologuard on file  No FIT/FOBT on file   No flexible sigmoidoscopy on file         Click Here for Release of Records Request

## 2024-09-05 ENCOUNTER — COMMUNITY OUTREACH (OUTPATIENT)
Facility: CLINIC | Age: 49
End: 2024-09-05

## 2025-01-20 ENCOUNTER — OFFICE VISIT (OUTPATIENT)
Facility: CLINIC | Age: 50
End: 2025-01-20

## 2025-01-20 DIAGNOSIS — Z23 NEED FOR VACCINATION: Primary | ICD-10-CM

## 2025-02-26 ENCOUNTER — COMMUNITY OUTREACH (OUTPATIENT)
Facility: CLINIC | Age: 50
End: 2025-02-26

## 2025-03-11 ENCOUNTER — OFFICE VISIT (OUTPATIENT)
Facility: CLINIC | Age: 50
End: 2025-03-11

## 2025-03-11 VITALS
OXYGEN SATURATION: 95 % | HEIGHT: 66 IN | SYSTOLIC BLOOD PRESSURE: 109 MMHG | TEMPERATURE: 98.2 F | DIASTOLIC BLOOD PRESSURE: 71 MMHG | BODY MASS INDEX: 25.01 KG/M2 | HEART RATE: 62 BPM | WEIGHT: 155.6 LBS | RESPIRATION RATE: 17 BRPM

## 2025-03-11 DIAGNOSIS — H10.9 CONJUNCTIVITIS OF LEFT EYE, UNSPECIFIED CONJUNCTIVITIS TYPE: ICD-10-CM

## 2025-03-11 DIAGNOSIS — J30.1 SEASONAL ALLERGIC RHINITIS DUE TO POLLEN: ICD-10-CM

## 2025-03-11 DIAGNOSIS — R05.1 ACUTE COUGH: Primary | ICD-10-CM

## 2025-03-11 RX ORDER — LORATADINE 10 MG/1
10 TABLET ORAL DAILY
Qty: 90 TABLET | Refills: 1 | Status: SHIPPED | OUTPATIENT
Start: 2025-03-11

## 2025-03-11 RX ORDER — OLOPATADINE HYDROCHLORIDE 1 MG/ML
1 SOLUTION/ DROPS OPHTHALMIC 2 TIMES DAILY
Qty: 5 ML | Refills: 2 | Status: SHIPPED | OUTPATIENT
Start: 2025-03-11 | End: 2025-09-07

## 2025-03-11 SDOH — ECONOMIC STABILITY: FOOD INSECURITY: WITHIN THE PAST 12 MONTHS, YOU WORRIED THAT YOUR FOOD WOULD RUN OUT BEFORE YOU GOT MONEY TO BUY MORE.: NEVER TRUE

## 2025-03-11 SDOH — ECONOMIC STABILITY: FOOD INSECURITY: WITHIN THE PAST 12 MONTHS, THE FOOD YOU BOUGHT JUST DIDN'T LAST AND YOU DIDN'T HAVE MONEY TO GET MORE.: NEVER TRUE

## 2025-03-11 ASSESSMENT — PATIENT HEALTH QUESTIONNAIRE - PHQ9
SUM OF ALL RESPONSES TO PHQ QUESTIONS 1-9: 0
2. FEELING DOWN, DEPRESSED OR HOPELESS: NOT AT ALL
SUM OF ALL RESPONSES TO PHQ QUESTIONS 1-9: 0
SUM OF ALL RESPONSES TO PHQ QUESTIONS 1-9: 0
1. LITTLE INTEREST OR PLEASURE IN DOING THINGS: NOT AT ALL
SUM OF ALL RESPONSES TO PHQ QUESTIONS 1-9: 0

## 2025-03-11 ASSESSMENT — ANXIETY QUESTIONNAIRES
IF YOU CHECKED OFF ANY PROBLEMS ON THIS QUESTIONNAIRE, HOW DIFFICULT HAVE THESE PROBLEMS MADE IT FOR YOU TO DO YOUR WORK, TAKE CARE OF THINGS AT HOME, OR GET ALONG WITH OTHER PEOPLE: NOT DIFFICULT AT ALL
4. TROUBLE RELAXING: NOT AT ALL
5. BEING SO RESTLESS THAT IT IS HARD TO SIT STILL: NOT AT ALL
GAD7 TOTAL SCORE: 0
6. BECOMING EASILY ANNOYED OR IRRITABLE: NOT AT ALL
7. FEELING AFRAID AS IF SOMETHING AWFUL MIGHT HAPPEN: NOT AT ALL
2. NOT BEING ABLE TO STOP OR CONTROL WORRYING: NOT AT ALL
3. WORRYING TOO MUCH ABOUT DIFFERENT THINGS: NOT AT ALL
1. FEELING NERVOUS, ANXIOUS, OR ON EDGE: NOT AT ALL

## 2025-03-11 NOTE — PROGRESS NOTES
Santiago Prince is a 50 y.o.  male and presents with    Chief Complaint   Patient presents with    Cough     Going on 2-3 days          Subjective:  Patient has had a cough for 2-3 days now. Sometimes productive with clear sputum. Has not taken any medication for the cough. Cough gets worse at night time. Mother says that he typically gets this when the seasons change because of his allergies. Denies runny nose, congestion, fever, shortness of breath, headache. Has some eye redness.           ROS   Respiratory ROS: positive for - cough    All other systems reviewed and are negative.      Objective:  Vitals:    03/11/25 1358   BP: 109/71   Pulse: 62   Resp: 17   Temp: 98.2 °F (36.8 °C)   SpO2: 95%         alert, well appearing, and in no distress  General appearance - alert, well appearing, and in no distress  Chest - clear to auscultation, no wheezes, rales or rhonchi, symmetric air entry  Heart - normal rate, regular rhythm, normal S1, S2, no murmurs, rubs, clicks or gallops  Eyes - redness in left eye        LABS     TESTS      Assessment/Plan:    1. Acute cough  Discussed with patient's guardian that this is related to his seasonal allergies    2. Seasonal allergic rhinitis due to pollen  Discussed with patient's guardian to use Flonase with Claritin if Claritin alone does not provide symptom relief  - loratadine (CLARITIN) 10 MG tablet; Take 1 tablet by mouth daily  Dispense: 90 tablet; Refill: 1    3. Conjunctivitis of left eye, unspecified conjunctivitis type  Given patanol  - olopatadine (PATANOL) 0.1 % ophthalmic solution; Place 1 drop into the left eye 2 times daily  Dispense: 5 mL; Refill: 2         I have discussed the diagnosis with the patient and the intended plan as seen in the above orders.  The patient has received an after-visit summary and questions were answered concerning future plans.  I have discussed medication side effects and warnings with the patient as well. I have

## 2025-05-12 ENCOUNTER — OFFICE VISIT (OUTPATIENT)
Facility: CLINIC | Age: 50
End: 2025-05-12
Payer: MEDICAID

## 2025-05-12 VITALS
OXYGEN SATURATION: 96 % | TEMPERATURE: 98.1 F | DIASTOLIC BLOOD PRESSURE: 65 MMHG | WEIGHT: 158 LBS | SYSTOLIC BLOOD PRESSURE: 98 MMHG | BODY MASS INDEX: 25.39 KG/M2 | HEIGHT: 66 IN | HEART RATE: 69 BPM | RESPIRATION RATE: 18 BRPM

## 2025-05-12 DIAGNOSIS — H10.9 CONJUNCTIVITIS OF LEFT EYE, UNSPECIFIED CONJUNCTIVITIS TYPE: ICD-10-CM

## 2025-05-12 DIAGNOSIS — J30.1 SEASONAL ALLERGIC RHINITIS DUE TO POLLEN: ICD-10-CM

## 2025-05-12 PROCEDURE — 99214 OFFICE O/P EST MOD 30 MIN: CPT | Performed by: FAMILY MEDICINE

## 2025-05-12 RX ORDER — MONTELUKAST SODIUM 10 MG/1
10 TABLET ORAL NIGHTLY
Qty: 30 TABLET | Refills: 0 | Status: SHIPPED | OUTPATIENT
Start: 2025-05-12 | End: 2025-05-12

## 2025-05-12 RX ORDER — OLOPATADINE HYDROCHLORIDE 1 MG/ML
1 SOLUTION OPHTHALMIC 2 TIMES DAILY
Qty: 5 ML | Refills: 3 | Status: SHIPPED | OUTPATIENT
Start: 2025-05-12 | End: 2025-05-12

## 2025-05-12 RX ORDER — FLUTICASONE PROPIONATE 50 MCG
1 SPRAY, SUSPENSION (ML) NASAL DAILY
Qty: 32 G | Refills: 1 | Status: SHIPPED | OUTPATIENT
Start: 2025-05-12 | End: 2025-05-12

## 2025-05-12 RX ORDER — LORATADINE 10 MG/1
10 TABLET ORAL DAILY
Qty: 90 TABLET | Refills: 1 | Status: SHIPPED | OUTPATIENT
Start: 2025-05-12 | End: 2025-05-12

## 2025-05-12 RX ORDER — OLOPATADINE HYDROCHLORIDE 1 MG/ML
1 SOLUTION OPHTHALMIC 2 TIMES DAILY
Qty: 5 ML | Refills: 3 | Status: SHIPPED | OUTPATIENT
Start: 2025-05-12 | End: 2025-11-08

## 2025-05-12 RX ORDER — LORATADINE 10 MG/1
10 TABLET ORAL DAILY
Qty: 90 TABLET | Refills: 1 | Status: SHIPPED | OUTPATIENT
Start: 2025-05-12

## 2025-05-12 RX ORDER — FLUTICASONE PROPIONATE 50 MCG
1 SPRAY, SUSPENSION (ML) NASAL DAILY
Qty: 32 G | Refills: 1 | Status: SHIPPED | OUTPATIENT
Start: 2025-05-12

## 2025-05-12 RX ORDER — MONTELUKAST SODIUM 10 MG/1
10 TABLET ORAL NIGHTLY
Qty: 30 TABLET | Refills: 0 | Status: SHIPPED | OUTPATIENT
Start: 2025-05-12

## 2025-05-12 SDOH — ECONOMIC STABILITY: FOOD INSECURITY: WITHIN THE PAST 12 MONTHS, THE FOOD YOU BOUGHT JUST DIDN'T LAST AND YOU DIDN'T HAVE MONEY TO GET MORE.: NEVER TRUE

## 2025-05-12 SDOH — ECONOMIC STABILITY: FOOD INSECURITY: WITHIN THE PAST 12 MONTHS, YOU WORRIED THAT YOUR FOOD WOULD RUN OUT BEFORE YOU GOT MONEY TO BUY MORE.: NEVER TRUE

## 2025-05-12 ASSESSMENT — PATIENT HEALTH QUESTIONNAIRE - PHQ9
SUM OF ALL RESPONSES TO PHQ QUESTIONS 1-9: 0
2. FEELING DOWN, DEPRESSED OR HOPELESS: NOT AT ALL
SUM OF ALL RESPONSES TO PHQ QUESTIONS 1-9: 0
1. LITTLE INTEREST OR PLEASURE IN DOING THINGS: NOT AT ALL

## 2025-05-31 ASSESSMENT — ENCOUNTER SYMPTOMS: COUGH: 1

## 2025-05-31 NOTE — PROGRESS NOTES
Santiago Prince is a 50 y.o. male who presents to the office today for the following:  Chief Complaint   Patient presents with    Cough       Allergies   Allergen Reactions    Amoxicillin Hives    Codeine Seizure    Tessalon [Benzonatate]      Does not help    Tramadol Seizure     Seizures         Current Outpatient Medications:     fluticasone (FLONASE) 50 MCG/ACT nasal spray, 1 spray by Each Nostril route daily, Disp: 32 g, Rfl: 1    loratadine (CLARITIN) 10 MG tablet, Take 1 tablet by mouth daily, Disp: 90 tablet, Rfl: 1    montelukast (SINGULAIR) 10 MG tablet, Take 1 tablet by mouth nightly, Disp: 30 tablet, Rfl: 0    olopatadine (PATANOL) 0.1 % ophthalmic solution, Place 1 drop into the left eye 2 times daily, Disp: 5 mL, Rfl: 3    vitamin D (ERGOCALCIFEROL) 1.25 MG (08408 UT) CAPS capsule, Take 1 capsule by mouth once a week (Patient not taking: Reported on 3/11/2025), Disp: 12 capsule, Rfl: 1    triamcinolone (KENALOG) 0.025 % cream, Apply topically 2 times daily. 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       Social History     Socioeconomic History    Marital status: Single     Spouse name: Not on file    Number of children: Not on file    Years of education: Not on file    Highest education level: Not on file   Occupational History    Not on file   Tobacco Use    Smoking status: Never    Smokeless tobacco: Never   Vaping Use    Vaping status: Never Used   Substance and Sexual Activity    Alcohol use: No    Drug use: No    Sexual activity: Not on file   Other Topics Concern    Not on file   Social History Narrative    Not on file     Social Drivers of Health     Financial Resource Strain: Low Risk  (8/2/2024)    Overall Financial